# Patient Record
Sex: MALE | Employment: OTHER | URBAN - NONMETROPOLITAN AREA
[De-identification: names, ages, dates, MRNs, and addresses within clinical notes are randomized per-mention and may not be internally consistent; named-entity substitution may affect disease eponyms.]

---

## 2017-02-02 DIAGNOSIS — K21.00 REFLUX ESOPHAGITIS: ICD-10-CM

## 2017-02-02 DIAGNOSIS — E78.5 HYPERLIPIDEMIA, UNSPECIFIED HYPERLIPIDEMIA TYPE: ICD-10-CM

## 2017-02-02 DIAGNOSIS — I10 ESSENTIAL HYPERTENSION: ICD-10-CM

## 2017-02-03 RX ORDER — AMLODIPINE BESYLATE 10 MG/1
10 TABLET ORAL DAILY
Qty: 90 TAB | Refills: 0 | Status: SHIPPED | OUTPATIENT
Start: 2017-02-03 | End: 2017-06-15 | Stop reason: SDUPTHER

## 2017-02-03 RX ORDER — CARVEDILOL 25 MG/1
25 TABLET ORAL 2 TIMES DAILY WITH MEALS
Qty: 180 TAB | Refills: 0 | Status: SHIPPED | OUTPATIENT
Start: 2017-02-03 | End: 2017-06-15 | Stop reason: SDUPTHER

## 2017-02-03 RX ORDER — SIMVASTATIN 40 MG
40 TABLET ORAL EVERY EVENING
Qty: 90 TAB | Refills: 0 | Status: SHIPPED | OUTPATIENT
Start: 2017-02-03 | End: 2017-06-15 | Stop reason: SDUPTHER

## 2017-02-03 RX ORDER — LISINOPRIL AND HYDROCHLOROTHIAZIDE 25; 20 MG/1; MG/1
1 TABLET ORAL 2 TIMES DAILY
Qty: 180 TAB | Refills: 0 | Status: SHIPPED | OUTPATIENT
Start: 2017-02-03 | End: 2017-06-15 | Stop reason: SDUPTHER

## 2017-02-03 RX ORDER — LANSOPRAZOLE 30 MG/1
30 CAPSULE, DELAYED RELEASE ORAL DAILY
Qty: 90 CAP | Refills: 0 | Status: SHIPPED | OUTPATIENT
Start: 2017-02-03 | End: 2017-06-15 | Stop reason: SDUPTHER

## 2017-02-03 RX ORDER — POTASSIUM CHLORIDE 20 MEQ/1
20 TABLET, EXTENDED RELEASE ORAL 2 TIMES DAILY
Qty: 180 TAB | Refills: 0 | Status: SHIPPED | OUTPATIENT
Start: 2017-02-03 | End: 2017-06-15 | Stop reason: SDUPTHER

## 2017-06-15 ENCOUNTER — OFFICE VISIT (OUTPATIENT)
Dept: MEDICAL GROUP | Facility: PHYSICIAN GROUP | Age: 62
End: 2017-06-15
Payer: COMMERCIAL

## 2017-06-15 VITALS
OXYGEN SATURATION: 97 % | TEMPERATURE: 98.2 F | HEART RATE: 54 BPM | WEIGHT: 167 LBS | SYSTOLIC BLOOD PRESSURE: 122 MMHG | BODY MASS INDEX: 26.84 KG/M2 | HEIGHT: 66 IN | DIASTOLIC BLOOD PRESSURE: 90 MMHG

## 2017-06-15 DIAGNOSIS — E78.01 FAMILIAL HYPERCHOLESTEROLEMIA: ICD-10-CM

## 2017-06-15 DIAGNOSIS — Z13.21 ENCOUNTER FOR VITAMIN DEFICIENCY SCREENING: ICD-10-CM

## 2017-06-15 DIAGNOSIS — E78.5 HYPERLIPIDEMIA, UNSPECIFIED HYPERLIPIDEMIA TYPE: ICD-10-CM

## 2017-06-15 DIAGNOSIS — K21.00 REFLUX ESOPHAGITIS: ICD-10-CM

## 2017-06-15 DIAGNOSIS — Z12.5 PROSTATE CANCER SCREENING: ICD-10-CM

## 2017-06-15 DIAGNOSIS — M19.031 OSTEOARTHRITIS OF BOTH WRISTS, UNSPECIFIED OSTEOARTHRITIS TYPE: ICD-10-CM

## 2017-06-15 DIAGNOSIS — Z86.010 PERSONAL HISTORY OF COLONIC POLYPS: ICD-10-CM

## 2017-06-15 DIAGNOSIS — I10 ESSENTIAL HYPERTENSION: ICD-10-CM

## 2017-06-15 DIAGNOSIS — M19.032 OSTEOARTHRITIS OF BOTH WRISTS, UNSPECIFIED OSTEOARTHRITIS TYPE: ICD-10-CM

## 2017-06-15 DIAGNOSIS — K21.9 GASTROESOPHAGEAL REFLUX DISEASE WITHOUT ESOPHAGITIS: ICD-10-CM

## 2017-06-15 PROCEDURE — 99214 OFFICE O/P EST MOD 30 MIN: CPT | Performed by: INTERNAL MEDICINE

## 2017-06-15 RX ORDER — LANSOPRAZOLE 30 MG/1
30 CAPSULE, DELAYED RELEASE ORAL DAILY
Qty: 90 CAP | Refills: 1 | Status: SHIPPED | OUTPATIENT
Start: 2017-06-15 | End: 2017-12-05 | Stop reason: SDUPTHER

## 2017-06-15 RX ORDER — POTASSIUM CHLORIDE 20 MEQ/1
20 TABLET, EXTENDED RELEASE ORAL 2 TIMES DAILY
Qty: 180 TAB | Refills: 3 | Status: SHIPPED | OUTPATIENT
Start: 2017-06-15 | End: 2017-07-27 | Stop reason: SDUPTHER

## 2017-06-15 RX ORDER — DICLOFENAC SODIUM 75 MG/1
75 TABLET, DELAYED RELEASE ORAL 2 TIMES DAILY
Qty: 60 TAB | Refills: 5 | Status: SHIPPED | OUTPATIENT
Start: 2017-06-15 | End: 2017-12-15

## 2017-06-15 RX ORDER — SIMVASTATIN 40 MG
40 TABLET ORAL EVERY EVENING
Qty: 90 TAB | Refills: 3 | Status: SHIPPED | OUTPATIENT
Start: 2017-06-15 | End: 2018-05-12 | Stop reason: SDUPTHER

## 2017-06-15 RX ORDER — LISINOPRIL AND HYDROCHLOROTHIAZIDE 25; 20 MG/1; MG/1
1 TABLET ORAL 2 TIMES DAILY
Qty: 180 TAB | Refills: 3 | Status: SHIPPED | OUTPATIENT
Start: 2017-06-15 | End: 2018-05-12 | Stop reason: SDUPTHER

## 2017-06-15 RX ORDER — CARVEDILOL 25 MG/1
25 TABLET ORAL 2 TIMES DAILY WITH MEALS
Qty: 180 TAB | Refills: 3 | Status: SHIPPED | OUTPATIENT
Start: 2017-06-15 | End: 2018-05-12 | Stop reason: SDUPTHER

## 2017-06-15 RX ORDER — AMLODIPINE BESYLATE 10 MG/1
10 TABLET ORAL DAILY
Qty: 90 TAB | Refills: 3 | Status: SHIPPED | OUTPATIENT
Start: 2017-06-15 | End: 2018-05-12 | Stop reason: SDUPTHER

## 2017-06-15 ASSESSMENT — PATIENT HEALTH QUESTIONNAIRE - PHQ9: CLINICAL INTERPRETATION OF PHQ2 SCORE: 0

## 2017-06-15 ASSESSMENT — PAIN SCALES - GENERAL: PAINLEVEL: NO PAIN

## 2017-06-15 NOTE — MR AVS SNAPSHOT
"        Ulysses Olivas   6/15/2017 10:30 AM   Office Visit   MRN: [id number]    Department:  [facility]   Dept Phone:  [phone]    Description:  Male : 1955   Provider:  Tiarra DE LEON M.D.           Reason for Visit     Medication Refill all meds      Allergies as of 6/15/2017     No Known Allergies      You were diagnosed with     Personal history of colonic polyps   [V12.72.ICD-9-CM]       Essential hypertension   [8420780]       Familial hypercholesterolemia   [264956]       Reflux esophagitis   [530.11.ICD-9-CM]       Hyperlipidemia, unspecified hyperlipidemia type   [8701346]       Osteoarthritis of both wrists, unspecified osteoarthritis type   [5474258]       Encounter for vitamin deficiency screening   [620199]       Prostate cancer screening   [966953]       Gastroesophageal reflux disease without esophagitis   [388146]         Vital Signs     Blood Pressure Pulse Temperature Height Weight Body Mass Index    122/90 mmHg 54 36.8 °C (98.2 °F) 1.676 m (5' 5.98\") 75.751 kg (167 lb) 26.97 kg/m2    Oxygen Saturation Smoking Status                97% Never Smoker           Basic Information     Date Of Birth Sex Race Ethnicity Preferred Language    1955 Male Unknown Unknown English      Problem List              ICD-10-CM Priority Class Noted - Resolved    HTN (hypertension) I10 Medium Stage 1 1/3/2013 - Present    Hyperlipemia E78.5 Low Chronic 1/3/2013 - Present    GERD (gastroesophageal reflux disease) K21.9 Low Chronic 1/3/2013 - Present    Arrhythmia I49.9   2015 - Present    Prostate cancer screening Z12.5   2016 - Present    Personal history of colonic polyps Z86.010   6/15/2017 - Present      Health Maintenance        Date Due Completion Dates    COLONOSCOPY 2006 ---    IMM DTaP/Tdap/Td Vaccine (2 - Td) 2021            Current Immunizations     SHINGLES VACCINE 2017    Tdap Vaccine 2011      Below and/or attached are the medications your " provider expects you to take. Review all of your home medications and newly ordered medications with your provider and/or pharmacist. Follow medication instructions as directed by your provider and/or pharmacist. Please keep your medication list with you and share with your provider. Update the information when medications are discontinued, doses are changed, or new medications (including over-the-counter products) are added; and carry medication information at all times in the event of emergency situations     Allergies:  No Known Allergies          Medications  Valid as of: Tosin 15, 2017 - 11:22 AM    Generic Name Brand Name Tablet Size Instructions for use    AmLODIPine Besylate (Tab) NORVASC 10 MG Take 1 Tab by mouth every day.        Carvedilol (Tab) COREG 25 MG Take 1 Tab by mouth 2 times a day, with meals.        Diclofenac Sodium (Tablet Delayed Response) VOLTAREN 75 MG Take 1 Tab by mouth 2 times a day.        Lansoprazole (CAPSULE DELAYED RELEASE) PREVACID 30 MG Take 1 Cap by mouth every day.        Lisinopril-Hydrochlorothiazide (Tab) PRINZIDE, ZESTORETIC 20-25 MG Take 1 Tab by mouth 2 times a day.        Potassium Chloride Saima CR (Tab CR) Kdur 20 MEQ Take 1 Tab by mouth 2 times a day.        Simvastatin (Tab) ZOCOR 40 MG Take 1 Tab by mouth every evening.        .                 Medicines prescribed today were sent to:     [facility], [address]    [address] [place] [id number]    Phone: [phone] Fax: [phone]    Open 24 Hours?: No      Medication refill instructions:       If your prescription bottle indicates you have medication refills left, it is not necessary to call your provider’s office. Please contact your pharmacy and they will refill your medication.    If your prescription bottle indicates you do not have any refills left, you may request refills at any time through one of the following ways: The online [employer] system (except  Urgent Care), by calling your provider’s office, or by asking your pharmacy to contact your provider’s office with a refill request. Medication refills are processed only during regular business hours and may not be available until the next business day. Your provider may request additional information or to have a follow-up visit with you prior to refilling your medication.   *Please Note: Medication refills are assigned a new Rx number when refilled electronically. Your pharmacy may indicate that no refills were authorized even though a new prescription for the same medication is available at the pharmacy. Please request the medicine by name with the pharmacy before contacting your provider for a refill.        Your To Do List     Future Labs/Procedures Complete By Expires    CBC WITH DIFFERENTIAL  As directed 6/15/2018    COMP METABOLIC PANEL  As directed 6/15/2018    LIPID PROFILE  As directed 6/15/2018    PROSTATE SPECIFIC AG SCREENING  As directed 6/15/2018    VITAMIN D,25 HYDROXY  As directed 6/15/2018      Referral     A referral request has been sent to our patient care coordination department. Please allow 3-5 business days for us to process this request and contact you either by phone or mail. If you do not hear from us by the 5th business day, please call us at [phone].           [employer] Access Code: Activation code not generated  Current [employer] Status: Active

## 2017-06-15 NOTE — ASSESSMENT & PLAN NOTE
Patient reports he has had his colonoscopy at age 51, was positive for some polyps which were removed. He has not had subsequent colonoscopy. Data family history of colon cancer. Feels fine no abdomen pain night sweats weight change.

## 2017-06-15 NOTE — ASSESSMENT & PLAN NOTE
Not following bp at home.  He feels good, continues on the meds, amlodipine, carvedilol, lisinopril, hctz, potasium.

## 2017-06-15 NOTE — ASSESSMENT & PLAN NOTE
Patient states he is ok as long as he stays on the lansoprazole at 1/4 capsule a day.  Otherwise will have symptoms at 4 days.

## 2017-06-15 NOTE — PROGRESS NOTES
Chief Complaint   Patient presents with   • Medication Refill     all meds       HISTORY OF PRESENT ILLNESS: Patient is a 62 y.o. male established patient who presents today to discuss the medical issues below.    Personal history of colonic polyps  Patient reports he has had his colonoscopy at age 51, was positive for some polyps which were removed. He has not had subsequent colonoscopy. Denies family history of colon cancer. Feels fine, no abdomen pain night sweats weight change.    HTN (hypertension)  Not following bp at home.  He feels good, continues on the meds, amlodipine, carvedilol, lisinopril, hctz, potasium.    Hyperlipemia  Patient has not had labs done since 2015.      GERD (gastroesophageal reflux disease)  Patient states he is ok as long as he stays on the lansoprazole at 1/4 capsule a day.  Otherwise will have symptoms at 4 days.  Requesting refill.      Patient Active Problem List    Diagnosis Date Noted   • HTN (hypertension) 01/03/2013     Priority: Medium     Class: Stage 1   • Hyperlipemia 01/03/2013     Priority: Low     Class: Chronic   • GERD (gastroesophageal reflux disease) 01/03/2013     Priority: Low     Class: Chronic   • Personal history of colonic polyps 06/15/2017   • Prostate cancer screening 01/08/2016   • Arrhythmia 01/06/2015       Allergies:Review of patient's allergies indicates no known allergies.    Current Outpatient Prescriptions   Medication Sig Dispense Refill   • amlodipine (NORVASC) 10 MG Tab Take 1 Tab by mouth every day. 90 Tab 3   • carvedilol (COREG) 25 MG Tab Take 1 Tab by mouth 2 times a day, with meals. 180 Tab 3   • lansoprazole (PREVACID) 30 MG CAPSULE DELAYED RELEASE Take 1 Cap by mouth every day. 90 Cap 1   • lisinopril-hydrochlorothiazide (PRINZIDE, ZESTORETIC) 20-25 MG per tablet Take 1 Tab by mouth 2 times a day. 180 Tab 3   • potassium chloride SA (KDUR) 20 MEQ Tab CR Take 1 Tab by mouth 2 times a day. 180 Tab 3   • simvastatin (ZOCOR) 40 MG Tab Take 1  "Tab by mouth every evening. 90 Tab 3   • diclofenac EC (VOLTAREN) 75 MG Tablet Delayed Response Take 1 Tab by mouth 2 times a day. 60 Tab 5     No current facility-administered medications for this visit.         Past Medical History   Diagnosis Date   • Hypertension    • Hyperlipidemia    • GERD (gastroesophageal reflux disease)    • SVT (supraventricular tachycardia)    • Prostate cancer screening 2016   • Personal history of colonic polyps 6/15/2017       Social History   Substance Use Topics   • Smoking status: Never Smoker    • Smokeless tobacco: Never Used   • Alcohol Use: No       Family Status   Relation Status Death Age   • Mother Alive    • Father       Family History   Problem Relation Age of Onset   • Hypertension Mother    • Cancer Father 75     prostate       ROS:    Respiratory: Negative for cough, sputum production, shortness of breath or wheezing.    Cardiovascular: Negative for chest pain, palpitations, orthopnea, dyspnea with exertion or edema.   Gastrointestinal: Negative for GI upset, nausea, vomiting, abdominal pain, constipation or diarrhea.   Genitourinary: Negative for dysuria, urgency, hesitancy or frequency.       Exam:    Blood pressure 122/90, pulse 54, temperature 36.8 °C (98.2 °F), height 1.676 m (5' 5.98\"), weight 75.751 kg (167 lb), SpO2 97 %.  General:  Well nourished, well developed male in NAD.  HENT: Normocephalic, bilateral TMs are intact, nasal and oral mucosa with no lesions,   Neck: Supple without bruit. Thyroid is not enlarged.  Pulmonary: Clear to ausculation and percussion.  Normal effort. No rales, rhonchi, or wheezing.  Cardiovascular: Regular rate and rhythm without murmur.   Abdomen: Normal bowel sounds soft and nontender no palpable liver spleen bladder mass.  Extremities: No LE edema noted.  Neuro: Grossly nonfocal.  Psych: Alert and oriented to person, place, and time. Appropriate mood and conversation.        This dictation was created using voice " recognition software. I have made reasonable attempts to correct errors, however, errors of grammar and content may exist.          Assessment/Plan:    1. Personal history of colonic polyps  Discussed at length the significance of the history of colonic polyps recommendation for colonoscopy for surveillance. Fit test is negative for any blood discussed implications lack of specificity patient agrees if covered by insurance  - REFERRAL TO GASTROENTEROLOGY    2. Essential hypertension  Borderline not really following at home refills written discussed home monitoring laboratory monitoring  - COMP METABOLIC PANEL; Future  - CBC WITH DIFFERENTIAL; Future  - amlodipine (NORVASC) 10 MG Tab; Take 1 Tab by mouth every day.  Dispense: 90 Tab; Refill: 3  - carvedilol (COREG) 25 MG Tab; Take 1 Tab by mouth 2 times a day, with meals.  Dispense: 180 Tab; Refill: 3  - lisinopril-hydrochlorothiazide (PRINZIDE, ZESTORETIC) 20-25 MG per tablet; Take 1 Tab by mouth 2 times a day.  Dispense: 180 Tab; Refill: 3  - potassium chloride SA (KDUR) 20 MEQ Tab CR; Take 1 Tab by mouth 2 times a day.  Dispense: 180 Tab; Refill: 3    3. Familial hypercholesterolemia  Patient on statin recommend lab monitoring discussed  - LIPID PROFILE; Future    4. Reflux esophagitis  Implications of breakthrough and the use of his medications. Considering the osteoarthritis and trial of diclofenac patient will increase the Prevacid to one daily.  - lansoprazole (PREVACID) 30 MG CAPSULE DELAYED RELEASE; Take 1 Cap by mouth every day.  Dispense: 90 Cap; Refill: 1    5. Hyperlipidemia, unspecified hyperlipidemia type  Refill on meds lab monitoring discussed  - simvastatin (ZOCOR) 40 MG Tab; Take 1 Tab by mouth every evening.  Dispense: 90 Tab; Refill: 3    6. Osteoarthritis of both wrists, unspecified osteoarthritis type  Some synovial thickening and perfusion of the wrists bilaterally. Recommend trial diclofenac prescription written discussed GI potential side  effects and cautions given.    7. Encounter for vitamin deficiency screening  Screening discussed  - VITAMIN D,25 HYDROXY; Future    8. Prostate cancer screening  Screening discussed  - PROSTATE SPECIFIC AG SCREENING; Future    Patient was seen for  25 minutes face to face of which more than 50% of the time was spent in counseling and coordination of care regarding the above problems.

## 2017-07-17 ENCOUNTER — TELEPHONE (OUTPATIENT)
Dept: MEDICAL GROUP | Facility: PHYSICIAN GROUP | Age: 62
End: 2017-07-17

## 2017-07-17 ENCOUNTER — HOSPITAL ENCOUNTER (OUTPATIENT)
Dept: LAB | Facility: MEDICAL CENTER | Age: 62
End: 2017-07-17
Attending: INTERNAL MEDICINE
Payer: COMMERCIAL

## 2017-07-17 DIAGNOSIS — I10 ESSENTIAL HYPERTENSION: ICD-10-CM

## 2017-07-17 DIAGNOSIS — E87.6 HYPOKALEMIA: ICD-10-CM

## 2017-07-17 DIAGNOSIS — E78.01 FAMILIAL HYPERCHOLESTEROLEMIA: ICD-10-CM

## 2017-07-17 DIAGNOSIS — Z12.5 PROSTATE CANCER SCREENING: ICD-10-CM

## 2017-07-17 DIAGNOSIS — Z13.21 ENCOUNTER FOR VITAMIN DEFICIENCY SCREENING: ICD-10-CM

## 2017-07-17 LAB
25(OH)D3 SERPL-MCNC: 38 NG/ML (ref 30–100)
ALBUMIN SERPL BCP-MCNC: 4 G/DL (ref 3.2–4.9)
ALBUMIN/GLOB SERPL: 1.5 G/DL
ALP SERPL-CCNC: 47 U/L (ref 30–99)
ALT SERPL-CCNC: 19 U/L (ref 2–50)
ANION GAP SERPL CALC-SCNC: 7 MMOL/L (ref 0–11.9)
AST SERPL-CCNC: 21 U/L (ref 12–45)
BASOPHILS # BLD AUTO: 0.6 % (ref 0–1.8)
BASOPHILS # BLD: 0.03 K/UL (ref 0–0.12)
BILIRUB SERPL-MCNC: 0.9 MG/DL (ref 0.1–1.5)
BUN SERPL-MCNC: 14 MG/DL (ref 8–22)
CALCIUM SERPL-MCNC: 9.4 MG/DL (ref 8.5–10.5)
CHLORIDE SERPL-SCNC: 104 MMOL/L (ref 96–112)
CHOLEST SERPL-MCNC: 130 MG/DL (ref 100–199)
CO2 SERPL-SCNC: 28 MMOL/L (ref 20–33)
CREAT SERPL-MCNC: 0.67 MG/DL (ref 0.5–1.4)
EOSINOPHIL # BLD AUTO: 0.09 K/UL (ref 0–0.51)
EOSINOPHIL NFR BLD: 1.7 % (ref 0–6.9)
ERYTHROCYTE [DISTWIDTH] IN BLOOD BY AUTOMATED COUNT: 38.3 FL (ref 35.9–50)
GFR SERPL CREATININE-BSD FRML MDRD: >60 ML/MIN/1.73 M 2
GLOBULIN SER CALC-MCNC: 2.6 G/DL (ref 1.9–3.5)
GLUCOSE SERPL-MCNC: 99 MG/DL (ref 65–99)
HCT VFR BLD AUTO: 38.4 % (ref 42–52)
HDLC SERPL-MCNC: 44 MG/DL
HGB BLD-MCNC: 13.4 G/DL (ref 14–18)
IMM GRANULOCYTES # BLD AUTO: 0 K/UL (ref 0–0.11)
IMM GRANULOCYTES NFR BLD AUTO: 0 % (ref 0–0.9)
LDLC SERPL CALC-MCNC: 71 MG/DL
LYMPHOCYTES # BLD AUTO: 1.89 K/UL (ref 1–4.8)
LYMPHOCYTES NFR BLD: 35.8 % (ref 22–41)
MCH RBC QN AUTO: 29.8 PG (ref 27–33)
MCHC RBC AUTO-ENTMCNC: 34.9 G/DL (ref 33.7–35.3)
MCV RBC AUTO: 85.5 FL (ref 81.4–97.8)
MONOCYTES # BLD AUTO: 0.53 K/UL (ref 0–0.85)
MONOCYTES NFR BLD AUTO: 10 % (ref 0–13.4)
NEUTROPHILS # BLD AUTO: 2.74 K/UL (ref 1.82–7.42)
NEUTROPHILS NFR BLD: 51.9 % (ref 44–72)
NRBC # BLD AUTO: 0 K/UL
NRBC BLD AUTO-RTO: 0 /100 WBC
PLATELET # BLD AUTO: 258 K/UL (ref 164–446)
PMV BLD AUTO: 10.3 FL (ref 9–12.9)
POTASSIUM SERPL-SCNC: 2.7 MMOL/L (ref 3.6–5.5)
PROT SERPL-MCNC: 6.6 G/DL (ref 6–8.2)
PSA SERPL-MCNC: 1.78 NG/ML (ref 0–4)
RBC # BLD AUTO: 4.49 M/UL (ref 4.7–6.1)
SODIUM SERPL-SCNC: 139 MMOL/L (ref 135–145)
TRIGL SERPL-MCNC: 75 MG/DL (ref 0–149)
WBC # BLD AUTO: 5.3 K/UL (ref 4.8–10.8)

## 2017-07-17 PROCEDURE — 80053 COMPREHEN METABOLIC PANEL: CPT

## 2017-07-17 PROCEDURE — 36415 COLL VENOUS BLD VENIPUNCTURE: CPT

## 2017-07-17 PROCEDURE — 85025 COMPLETE CBC W/AUTO DIFF WBC: CPT

## 2017-07-17 PROCEDURE — 84153 ASSAY OF PSA TOTAL: CPT

## 2017-07-17 PROCEDURE — 80061 LIPID PANEL: CPT

## 2017-07-17 PROCEDURE — 82306 VITAMIN D 25 HYDROXY: CPT

## 2017-07-18 NOTE — TELEPHONE ENCOUNTER
After hours call from lab, critical lab level potassium 2.7.  All other labs wnl including Cr and GFR  Telephone to cell, no answer, message left to contact the office in the am:    We need to confirm that patient is taking his potassium 2 x a day  If so, he needs to double that and then have recheck on his potassium in 1 week.    If he has not been taking the potassium, he needs to start and have labs rechecked in 1 week    Order placed for lab.

## 2017-07-26 ENCOUNTER — HOSPITAL ENCOUNTER (OUTPATIENT)
Dept: LAB | Facility: MEDICAL CENTER | Age: 62
End: 2017-07-26
Attending: INTERNAL MEDICINE
Payer: COMMERCIAL

## 2017-07-26 DIAGNOSIS — I10 ESSENTIAL HYPERTENSION: ICD-10-CM

## 2017-07-26 DIAGNOSIS — E87.6 HYPOKALEMIA: ICD-10-CM

## 2017-07-26 LAB
ANION GAP SERPL CALC-SCNC: 6 MMOL/L (ref 0–11.9)
BUN SERPL-MCNC: 18 MG/DL (ref 8–22)
CALCIUM SERPL-MCNC: 9.1 MG/DL (ref 8.5–10.5)
CHLORIDE SERPL-SCNC: 107 MMOL/L (ref 96–112)
CO2 SERPL-SCNC: 27 MMOL/L (ref 20–33)
CREAT SERPL-MCNC: 0.75 MG/DL (ref 0.5–1.4)
GFR SERPL CREATININE-BSD FRML MDRD: >60 ML/MIN/1.73 M 2
GLUCOSE SERPL-MCNC: 96 MG/DL (ref 65–99)
POTASSIUM SERPL-SCNC: 3.4 MMOL/L (ref 3.6–5.5)
SODIUM SERPL-SCNC: 140 MMOL/L (ref 135–145)

## 2017-07-26 PROCEDURE — 36415 COLL VENOUS BLD VENIPUNCTURE: CPT

## 2017-07-26 PROCEDURE — 80048 BASIC METABOLIC PNL TOTAL CA: CPT

## 2017-07-27 ENCOUNTER — TELEPHONE (OUTPATIENT)
Dept: MEDICAL GROUP | Facility: PHYSICIAN GROUP | Age: 62
End: 2017-07-27

## 2017-07-27 DIAGNOSIS — I10 ESSENTIAL HYPERTENSION: ICD-10-CM

## 2017-07-27 DIAGNOSIS — E87.6 HYPOKALEMIA: ICD-10-CM

## 2017-07-27 RX ORDER — POTASSIUM CHLORIDE 20 MEQ/1
40 TABLET, EXTENDED RELEASE ORAL 2 TIMES DAILY
Qty: 360 TAB | Refills: 3 | Status: SHIPPED | OUTPATIENT
Start: 2017-07-27 | End: 2018-05-12 | Stop reason: SDUPTHER

## 2017-07-27 NOTE — TELEPHONE ENCOUNTER
Pt states that he will run out of potassium if he is taking that many.  Can this be refilled?  Please advise

## 2017-07-27 NOTE — TELEPHONE ENCOUNTER
Please notify patient that with the double dose his potassium is now nearly normal.  I would recommend a few extra tabs daily (2 tabs 3 x a day) for just 3 days, then back to the 2 tabs bid, recheck labs in about 6 weeks to make sure holding ok.  I have ordered labs.

## 2017-12-04 ENCOUNTER — NON-PROVIDER VISIT (OUTPATIENT)
Dept: URGENT CARE | Facility: PHYSICIAN GROUP | Age: 62
End: 2017-12-04

## 2017-12-04 DIAGNOSIS — Z11.1 ENCOUNTER FOR PPD TEST: ICD-10-CM

## 2017-12-04 PROCEDURE — 86580 TB INTRADERMAL TEST: CPT | Performed by: PHYSICIAN ASSISTANT

## 2017-12-06 ENCOUNTER — NON-PROVIDER VISIT (OUTPATIENT)
Dept: URGENT CARE | Facility: PHYSICIAN GROUP | Age: 62
End: 2017-12-06
Payer: COMMERCIAL

## 2017-12-06 DIAGNOSIS — Z11.1 ENCOUNTER FOR PPD SKIN TEST READING: ICD-10-CM

## 2017-12-06 LAB — TB WHEAL 3D P 5 TU DIAM: NORMAL MM

## 2017-12-15 ENCOUNTER — OFFICE VISIT (OUTPATIENT)
Dept: MEDICAL GROUP | Facility: PHYSICIAN GROUP | Age: 62
End: 2017-12-15
Payer: COMMERCIAL

## 2017-12-15 VITALS
HEIGHT: 66 IN | HEART RATE: 58 BPM | RESPIRATION RATE: 16 BRPM | BODY MASS INDEX: 28.61 KG/M2 | WEIGHT: 178 LBS | SYSTOLIC BLOOD PRESSURE: 120 MMHG | DIASTOLIC BLOOD PRESSURE: 88 MMHG | OXYGEN SATURATION: 96 % | TEMPERATURE: 98.1 F

## 2017-12-15 DIAGNOSIS — Z12.5 PROSTATE CANCER SCREENING: ICD-10-CM

## 2017-12-15 DIAGNOSIS — I10 ESSENTIAL HYPERTENSION: ICD-10-CM

## 2017-12-15 DIAGNOSIS — E55.9 VITAMIN D DEFICIENCY: ICD-10-CM

## 2017-12-15 DIAGNOSIS — E78.01 FAMILIAL HYPERCHOLESTEROLEMIA: ICD-10-CM

## 2017-12-15 DIAGNOSIS — M71.339: ICD-10-CM

## 2017-12-15 PROCEDURE — 99214 OFFICE O/P EST MOD 30 MIN: CPT | Performed by: INTERNAL MEDICINE

## 2017-12-15 RX ORDER — CELECOXIB 100 MG/1
100 CAPSULE ORAL 2 TIMES DAILY
Qty: 60 CAP | Refills: 3 | Status: SHIPPED | OUTPATIENT
Start: 2017-12-15 | End: 2018-08-02

## 2017-12-15 ASSESSMENT — PAIN SCALES - GENERAL: PAINLEVEL: 9=SEVERE PAIN

## 2017-12-15 NOTE — ASSESSMENT & PLAN NOTE
Patient reports that he is continually having burning sensation across the back of his hand off and on throughout the day but mostly at night. The echogenic was not helpful at all. He does do a lot of physical labor during the day such as shoveling or hammering. He believes he follows a position of function. He does have deformity of both wrists at the medial aspect and cannot identify how long the deformity has been present. He states his father's hands look exactly the same. He cannot recall any antedating trauma. Right hand is worse than the left. No weakness no pain.

## 2017-12-15 NOTE — PATIENT INSTRUCTIONS
Celebrex 100 mg 1 tab 2 x a day with food  If not improved in 2 weeks, consider steroid trial  Consider orthopedic consult.

## 2017-12-15 NOTE — PROGRESS NOTES
Chief Complaint   Patient presents with   • Wrist Pain     FV medication/ diclofenac       HISTORY OF PRESENT ILLNESS: Patient is a 62 y.o. male established patient who presents today to discuss the medical issues below.    HTN (hypertension)  Patient states he feels fine.  No chest pain or sob.      Other bursal cyst of wrist  Patient reports that he is continually having burning sensation across the back of his hand off and on throughout the day but mostly at night. The echogenic was not helpful at all. He does do a lot of physical labor during the day such as shoveling or hammering. He believes he follows a position of function. He does have deformity of both wrists at the medial aspect and cannot identify how long the deformity has been present. He states his father's hands look exactly the same. He cannot recall any antedating trauma. Right hand is worse than the left. No weakness no pain.      Patient Active Problem List    Diagnosis Date Noted   • HTN (hypertension) 01/03/2013     Priority: Medium     Class: Stage 1   • Hyperlipemia 01/03/2013     Priority: Low     Class: Chronic   • GERD (gastroesophageal reflux disease) 01/03/2013     Priority: Low     Class: Chronic   • Other bursal cyst of wrist 12/15/2017   • Personal history of colonic polyps 06/15/2017   • Prostate cancer screening 01/08/2016   • Arrhythmia 01/06/2015       Allergies:Patient has no known allergies.    Current Outpatient Prescriptions   Medication Sig Dispense Refill   • celecoxib (CELEBREX) 100 MG Cap Take 1 Cap by mouth 2 times a day. 60 Cap 3   • lansoprazole (PREVACID) 30 MG CAPSULE DELAYED RELEASE TAKE ONE CAPSULE BY MOUTH EVERY DAY 90 Cap 3   • potassium chloride SA (KDUR) 20 MEQ Tab CR Take 2 Tabs by mouth 2 times a day. 360 Tab 3   • amlodipine (NORVASC) 10 MG Tab Take 1 Tab by mouth every day. 90 Tab 3   • carvedilol (COREG) 25 MG Tab Take 1 Tab by mouth 2 times a day, with meals. 180 Tab 3   • lisinopril-hydrochlorothiazide  "(PRINZIDE, ZESTORETIC) 20-25 MG per tablet Take 1 Tab by mouth 2 times a day. 180 Tab 3   • simvastatin (ZOCOR) 40 MG Tab Take 1 Tab by mouth every evening. 90 Tab 3     No current facility-administered medications for this visit.          Past Medical History:   Diagnosis Date   • GERD (gastroesophageal reflux disease)    • Hyperlipidemia    • Hypertension    • Personal history of colonic polyps 6/15/2017   • Prostate cancer screening 2016   • SVT (supraventricular tachycardia) ([facility])        Social History   Substance Use Topics   • Smoking status: Never Smoker   • Smokeless tobacco: Never Used   • Alcohol use No       Family Status   Relation Status   • Mother Alive   • Father      Family History   Problem Relation Age of Onset   • Hypertension Mother    • Cancer Father 75     prostate       ROS:    Respiratory: Negative for cough, sputum production, shortness of breath or wheezing.    Cardiovascular: Negative for chest pain, palpitations, orthopnea, dyspnea with exertion or edema.   Gastrointestinal: Negative for GI upset, nausea, vomiting, abdominal pain, constipation or diarrhea.   Genitourinary: Negative for dysuria, urgency, hesitancy or frequency.       Exam:    Blood pressure 120/88, pulse (!) 58, temperature 36.7 °C (98.1 °F), resp. rate 16, height 1.676 m (5' 6\"), weight 80.7 kg (178 lb), SpO2 96 %.  General:  Well nourished, well developed male in NAD.  HENT: Normocephalic, bilateral TMs are intact, nasal and oral mucosa with no lesions,   Neck: Supple without bruit. Thyroid is not enlarged.  Pulmonary: Clear to ausculation and percussion.  Normal effort. No rales, rhonchi, or wheezing.  Cardiovascular: Regular rate and rhythm without murmur.   Abdomen: Normal bowel sounds soft and nontender no palpable liver spleen bladder mass.  Upper extremity: Wrists bilaterally at the base of the thumb medial aspect of the wrist palpable fullness ballotable nontender. No wrist tenderness cannot " appreciate an effusion on either wrist intact light touch strength and sensation throughout the entire hand and wrist and forearm. No exacerbation with pronation supination no tenderness at the elbow.   Neuro: Grossly nonfocal.  Psych: Alert and oriented to person, place, and time. Appropriate mood and conversation.        This dictation was created using voice recognition software. I have made reasonable attempts to correct errors, however, errors of grammar and content may exist.          Assessment/Plan:    1. Essential hypertension  Stable with ongoing medications he hasn't had his potassium reassessed I recommend lab work  - COMP METABOLIC PANEL; Future  - CBC WITH DIFFERENTIAL; Future    2. Other bursal cyst of wrist, unspecified laterality  It appears that the bursal cysts are impinging on the nerves causing a minor neuropathy. I've recommended referral to orthopedic he declines. He would like a trial of another anti-inflammatory. Trial change to Celebrex. If still persisting consider a Medrol Dosepak. Discussed with the patient option for [facility] to be able to aspirate and/or inject. Discussed potential differential of an autoimmune disease however he declines laboratory assessment at this time as well. Monitor support.    3. Familial hypercholesterolemia  Continues on simvastatin discussed annual monitoring  - LIPID PROFILE; Future    4. Prostate cancer screening  Discussed annual monitoring PSA  - PROSTATE SPECIFIC AG SCREENING; Future    5. Vitamin D deficiency  Discussed vitamin D monitoring  - VITAMIN D,25 HYDROXY; Future    Patient was seen for  25 minutes face to face of which more than 50% of the time was spent in counseling and coordination of care regarding the above problems.

## 2018-02-15 DIAGNOSIS — E78.5 HYPERLIPIDEMIA, UNSPECIFIED HYPERLIPIDEMIA TYPE: ICD-10-CM

## 2018-02-15 DIAGNOSIS — I10 ESSENTIAL HYPERTENSION: ICD-10-CM

## 2018-02-15 RX ORDER — CARVEDILOL 25 MG/1
TABLET ORAL
Refills: 3 | OUTPATIENT
Start: 2018-02-15

## 2018-02-15 RX ORDER — LISINOPRIL AND HYDROCHLOROTHIAZIDE 25; 20 MG/1; MG/1
TABLET ORAL
Refills: 3 | OUTPATIENT
Start: 2018-02-15

## 2018-02-15 RX ORDER — POTASSIUM CHLORIDE 20 MEQ/1
TABLET, EXTENDED RELEASE ORAL
Refills: 3 | OUTPATIENT
Start: 2018-02-15

## 2018-02-15 RX ORDER — AMLODIPINE BESYLATE 10 MG/1
TABLET ORAL
Refills: 3 | OUTPATIENT
Start: 2018-02-15

## 2018-02-15 RX ORDER — SIMVASTATIN 40 MG
TABLET ORAL
Refills: 3 | OUTPATIENT
Start: 2018-02-15

## 2018-02-15 RX ORDER — CELECOXIB 100 MG/1
CAPSULE ORAL
Refills: 3 | OUTPATIENT
Start: 2018-02-15

## 2018-03-05 ENCOUNTER — PATIENT MESSAGE (OUTPATIENT)
Dept: MEDICAL GROUP | Facility: PHYSICIAN GROUP | Age: 63
End: 2018-03-05

## 2018-03-05 NOTE — TELEPHONE ENCOUNTER
From: Ulysses Olivas  To: Tiarra DE LEON M.D.  Sent: 3/5/2018 10:32 AM PST  Subject: Procedure Question    My wife has been trying to make an appointment with you. The system will not allow it because she has not been seen by you before. One of the first questions I ask you as a new patient was if you would see her also and you told me yes. What do we have to do to make this happen. We have left a message with your office but no responce. Please respond. Ulysses

## 2018-05-12 DIAGNOSIS — I10 ESSENTIAL HYPERTENSION: ICD-10-CM

## 2018-05-12 DIAGNOSIS — E78.5 HYPERLIPIDEMIA, UNSPECIFIED HYPERLIPIDEMIA TYPE: ICD-10-CM

## 2018-05-16 ENCOUNTER — TELEPHONE (OUTPATIENT)
Dept: MEDICAL GROUP | Facility: PHYSICIAN GROUP | Age: 63
End: 2018-05-16

## 2018-05-16 DIAGNOSIS — I10 ESSENTIAL HYPERTENSION: ICD-10-CM

## 2018-05-16 RX ORDER — AMLODIPINE BESYLATE 10 MG/1
TABLET ORAL
Qty: 90 TAB | Refills: 3 | Status: SHIPPED | OUTPATIENT
Start: 2018-05-16 | End: 2019-07-09 | Stop reason: SDUPTHER

## 2018-05-16 RX ORDER — SIMVASTATIN 40 MG
TABLET ORAL
Qty: 90 TAB | Refills: 3 | Status: SHIPPED | OUTPATIENT
Start: 2018-05-16 | End: 2019-07-09 | Stop reason: SDUPTHER

## 2018-05-16 RX ORDER — POTASSIUM CHLORIDE 20 MEQ/1
TABLET, EXTENDED RELEASE ORAL
Qty: 180 TAB | Refills: 3 | Status: SHIPPED | OUTPATIENT
Start: 2018-05-16 | End: 2018-07-18 | Stop reason: SDUPTHER

## 2018-05-16 RX ORDER — CARVEDILOL 25 MG/1
25 TABLET ORAL 2 TIMES DAILY WITH MEALS
Qty: 180 TAB | Refills: 3 | Status: SHIPPED
Start: 2018-05-16 | End: 2020-02-10

## 2018-05-16 RX ORDER — LISINOPRIL AND HYDROCHLOROTHIAZIDE 25; 20 MG/1; MG/1
TABLET ORAL
Qty: 90 TAB | Refills: 3 | Status: SHIPPED | OUTPATIENT
Start: 2018-05-16 | End: 2018-05-16 | Stop reason: SDUPTHER

## 2018-05-16 RX ORDER — CARVEDILOL 25 MG/1
TABLET ORAL
Qty: 90 TAB | Refills: 3 | Status: SHIPPED | OUTPATIENT
Start: 2018-05-16 | End: 2018-05-16 | Stop reason: SDUPTHER

## 2018-05-16 RX ORDER — LISINOPRIL AND HYDROCHLOROTHIAZIDE 25; 20 MG/1; MG/1
1 TABLET ORAL 2 TIMES DAILY
Qty: 180 TAB | Refills: 3 | Status: SHIPPED | OUTPATIENT
Start: 2018-05-16 | End: 2018-08-02

## 2018-05-16 NOTE — TELEPHONE ENCOUNTER
1. Caller Name:[facility]                      Call Back Number: [phone]    2. Message: rosemary called regarding carvedilol, lisinopril and pottasium Chloride refills that they received today.  They said that The carvedilol and lisinopril are both 2 tabs daily but the scripts are written for 90 tabs instead of 180. And the potassium Chloride is written for 2 tabs 2 times a day 180 tabs, can this be changed to 360 tabs?  Please advise     3. Patient approves office to leave a detailed voicemail/MyChart message: N\A

## 2018-07-11 ENCOUNTER — HOSPITAL ENCOUNTER (OUTPATIENT)
Dept: LAB | Facility: MEDICAL CENTER | Age: 63
End: 2018-07-11
Attending: INTERNAL MEDICINE
Payer: COMMERCIAL

## 2018-07-11 DIAGNOSIS — Z12.5 PROSTATE CANCER SCREENING: ICD-10-CM

## 2018-07-11 DIAGNOSIS — E78.01 FAMILIAL HYPERCHOLESTEROLEMIA: ICD-10-CM

## 2018-07-11 DIAGNOSIS — I10 ESSENTIAL HYPERTENSION: ICD-10-CM

## 2018-07-11 DIAGNOSIS — E55.9 VITAMIN D DEFICIENCY: ICD-10-CM

## 2018-07-11 LAB
25(OH)D3 SERPL-MCNC: 51 NG/ML (ref 30–100)
ALBUMIN SERPL BCP-MCNC: 4.4 G/DL (ref 3.2–4.9)
ALBUMIN/GLOB SERPL: 1.8 G/DL
ALP SERPL-CCNC: 47 U/L (ref 30–99)
ALT SERPL-CCNC: 43 U/L (ref 2–50)
ANION GAP SERPL CALC-SCNC: 12 MMOL/L (ref 0–11.9)
AST SERPL-CCNC: 29 U/L (ref 12–45)
BASOPHILS # BLD AUTO: 0.9 % (ref 0–1.8)
BASOPHILS # BLD: 0.05 K/UL (ref 0–0.12)
BILIRUB SERPL-MCNC: 1.1 MG/DL (ref 0.1–1.5)
BUN SERPL-MCNC: 14 MG/DL (ref 8–22)
CALCIUM SERPL-MCNC: 9.3 MG/DL (ref 8.5–10.5)
CHLORIDE SERPL-SCNC: 101 MMOL/L (ref 96–112)
CHOLEST SERPL-MCNC: 155 MG/DL (ref 100–199)
CO2 SERPL-SCNC: 27 MMOL/L (ref 20–33)
CREAT SERPL-MCNC: 0.73 MG/DL (ref 0.5–1.4)
EOSINOPHIL # BLD AUTO: 0.09 K/UL (ref 0–0.51)
EOSINOPHIL NFR BLD: 1.6 % (ref 0–6.9)
ERYTHROCYTE [DISTWIDTH] IN BLOOD BY AUTOMATED COUNT: 39.7 FL (ref 35.9–50)
GLOBULIN SER CALC-MCNC: 2.5 G/DL (ref 1.9–3.5)
GLUCOSE SERPL-MCNC: 98 MG/DL (ref 65–99)
HCT VFR BLD AUTO: 41.9 % (ref 42–52)
HDLC SERPL-MCNC: 38 MG/DL
HGB BLD-MCNC: 14.5 G/DL (ref 14–18)
IMM GRANULOCYTES # BLD AUTO: 0.01 K/UL (ref 0–0.11)
IMM GRANULOCYTES NFR BLD AUTO: 0.2 % (ref 0–0.9)
LDLC SERPL CALC-MCNC: 95 MG/DL
LYMPHOCYTES # BLD AUTO: 2.09 K/UL (ref 1–4.8)
LYMPHOCYTES NFR BLD: 36.5 % (ref 22–41)
MCH RBC QN AUTO: 29.8 PG (ref 27–33)
MCHC RBC AUTO-ENTMCNC: 34.6 G/DL (ref 33.7–35.3)
MCV RBC AUTO: 86 FL (ref 81.4–97.8)
MONOCYTES # BLD AUTO: 0.55 K/UL (ref 0–0.85)
MONOCYTES NFR BLD AUTO: 9.6 % (ref 0–13.4)
NEUTROPHILS # BLD AUTO: 2.94 K/UL (ref 1.82–7.42)
NEUTROPHILS NFR BLD: 51.2 % (ref 44–72)
NRBC # BLD AUTO: 0 K/UL
NRBC BLD-RTO: 0 /100 WBC
PLATELET # BLD AUTO: 255 K/UL (ref 164–446)
PMV BLD AUTO: 10.5 FL (ref 9–12.9)
POTASSIUM SERPL-SCNC: 2.6 MMOL/L (ref 3.6–5.5)
PROT SERPL-MCNC: 6.9 G/DL (ref 6–8.2)
PSA SERPL-MCNC: 2.95 NG/ML (ref 0–4)
RBC # BLD AUTO: 4.87 M/UL (ref 4.7–6.1)
SODIUM SERPL-SCNC: 140 MMOL/L (ref 135–145)
TRIGL SERPL-MCNC: 109 MG/DL (ref 0–149)
WBC # BLD AUTO: 5.7 K/UL (ref 4.8–10.8)

## 2018-07-11 PROCEDURE — 84153 ASSAY OF PSA TOTAL: CPT

## 2018-07-11 PROCEDURE — 36415 COLL VENOUS BLD VENIPUNCTURE: CPT

## 2018-07-11 PROCEDURE — 85025 COMPLETE CBC W/AUTO DIFF WBC: CPT

## 2018-07-11 PROCEDURE — 82306 VITAMIN D 25 HYDROXY: CPT

## 2018-07-11 PROCEDURE — 80053 COMPREHEN METABOLIC PANEL: CPT

## 2018-07-11 PROCEDURE — 80061 LIPID PANEL: CPT

## 2018-07-12 ENCOUNTER — TELEPHONE (OUTPATIENT)
Dept: MEDICAL GROUP | Facility: PHYSICIAN GROUP | Age: 63
End: 2018-07-12

## 2018-07-12 NOTE — TELEPHONE ENCOUNTER
----- Message from Tiarra DE LEON M.D. sent at 7/12/2018  7:51 AM PDT -----  Please contact the patient and make sure he is taking his potassium.  If so, I recommend he take an extra 2 tabs a day until the OV as his level is low.    Thank you,  Tiarra DE LEON M.D.

## 2018-07-12 NOTE — TELEPHONE ENCOUNTER
Ulysses stated that he is taking 4 tablets of his potassium every day.  I let him know that you want him taking 2 more tabs a day.  He has an apt with you on 7/18/18

## 2018-07-12 NOTE — PROGRESS NOTES
Please contact the patient and make sure he is taking his potassium.  If so, I recommend he take an extra 2 tabs a day until the OV as his level is low.    Thank you,  Tiarra DE LEON M.D.

## 2018-07-18 ENCOUNTER — TELEPHONE (OUTPATIENT)
Dept: MEDICAL GROUP | Facility: PHYSICIAN GROUP | Age: 63
End: 2018-07-18

## 2018-07-18 DIAGNOSIS — I10 ESSENTIAL HYPERTENSION: ICD-10-CM

## 2018-07-18 DIAGNOSIS — E87.6 HYPOKALEMIA: ICD-10-CM

## 2018-07-18 RX ORDER — POTASSIUM CHLORIDE 20 MEQ/1
40 TABLET, EXTENDED RELEASE ORAL 2 TIMES DAILY
Qty: 360 TAB | Refills: 1 | Status: SHIPPED | OUTPATIENT
Start: 2018-07-18 | End: 2018-07-19 | Stop reason: SDUPTHER

## 2018-07-18 RX ORDER — POTASSIUM CHLORIDE 20 MEQ/1
40 TABLET, EXTENDED RELEASE ORAL 2 TIMES DAILY
Qty: 180 TAB | Refills: 3 | Status: CANCELLED | OUTPATIENT
Start: 2018-07-18

## 2018-07-18 NOTE — TELEPHONE ENCOUNTER
1. Caller Name: Pt                      Call Back Number: [phone] (home)     2. Message: Pt called in stating that he was to have an OV this week to go over labs and Pot[place]um intake. Dr. Monterroso was to let him know if is to continue on potassium or stop it but now doesn't see her till 8/2/18 and will need a refill. Pt would like to know what he should do. Please advise.    3. Patient approves office to leave a detailed voicemail/MyChart message: yes

## 2018-07-18 NOTE — TELEPHONE ENCOUNTER
Keep taking the 2 tablets a day. Recommend we check the potassium level in the next 2 days. Order placed. Will have more recommendations once I have the lab results.     All other lab results were great

## 2018-07-19 ENCOUNTER — PATIENT MESSAGE (OUTPATIENT)
Dept: MEDICAL GROUP | Facility: PHYSICIAN GROUP | Age: 63
End: 2018-07-19

## 2018-07-19 ENCOUNTER — TELEPHONE (OUTPATIENT)
Dept: MEDICAL GROUP | Facility: PHYSICIAN GROUP | Age: 63
End: 2018-07-19

## 2018-07-19 DIAGNOSIS — I10 ESSENTIAL HYPERTENSION: ICD-10-CM

## 2018-07-19 DIAGNOSIS — E87.6 HYPOKALEMIA: ICD-10-CM

## 2018-07-19 RX ORDER — POTASSIUM CHLORIDE 20 MEQ/1
40 TABLET, EXTENDED RELEASE ORAL 3 TIMES DAILY
Qty: 540 TAB | Refills: 3 | Status: SHIPPED | OUTPATIENT
Start: 2018-07-19 | End: 2019-07-29 | Stop reason: SDUPTHER

## 2018-07-19 NOTE — TELEPHONE ENCOUNTER
Patient called and stated that his insurance will not cover his potassium unless it stated to take 6 tabs daily.   wrote a telephone encounter on 7/12/18 that she wanted him to take 2 extra tabs a day until her OV with him.  Can his script be changed to say 6 tabs a day?  Please advise

## 2018-07-19 NOTE — TELEPHONE ENCOUNTER
Will adjust Rx but he may not be taking  6 tablets on a daily basis, we need to go off of the labs.

## 2018-07-19 NOTE — TELEPHONE ENCOUNTER
----- Message from Lana Brown sent at 7/19/2018 12:51 PM PDT -----  Regarding: Potassium RX    Pt states that insurance will not cover refill for:     potassium chloride SA (KDUR) 20 MEQ Tab CR     Pt states that he has been taking 4 tabs daily and that Dr Monterroso advised him to increase to 6. Pt states that insurance will not cover unless prescription says to take 6 tab daily.   Please advise.      Thank you

## 2018-08-02 ENCOUNTER — OFFICE VISIT (OUTPATIENT)
Dept: MEDICAL GROUP | Facility: PHYSICIAN GROUP | Age: 63
End: 2018-08-02
Payer: COMMERCIAL

## 2018-08-02 VITALS
HEART RATE: 61 BPM | BODY MASS INDEX: 28.51 KG/M2 | OXYGEN SATURATION: 98 % | SYSTOLIC BLOOD PRESSURE: 116 MMHG | DIASTOLIC BLOOD PRESSURE: 60 MMHG | TEMPERATURE: 98.3 F | HEIGHT: 66 IN | RESPIRATION RATE: 14 BRPM | WEIGHT: 177.4 LBS

## 2018-08-02 DIAGNOSIS — M25.531 PAIN IN BOTH WRISTS: ICD-10-CM

## 2018-08-02 DIAGNOSIS — I10 ESSENTIAL HYPERTENSION: ICD-10-CM

## 2018-08-02 DIAGNOSIS — E87.6 HYPOKALEMIA: ICD-10-CM

## 2018-08-02 DIAGNOSIS — M25.532 PAIN IN BOTH WRISTS: ICD-10-CM

## 2018-08-02 DIAGNOSIS — Z12.5 PROSTATE CANCER SCREENING: ICD-10-CM

## 2018-08-02 DIAGNOSIS — E78.01 FAMILIAL HYPERCHOLESTEROLEMIA: ICD-10-CM

## 2018-08-02 PROCEDURE — 99214 OFFICE O/P EST MOD 30 MIN: CPT | Performed by: INTERNAL MEDICINE

## 2018-08-02 RX ORDER — LISINOPRIL 10 MG/1
10 TABLET ORAL DAILY
Qty: 30 TAB | Refills: 3 | Status: SHIPPED | OUTPATIENT
Start: 2018-08-02 | End: 2018-08-03 | Stop reason: CLARIF

## 2018-08-02 ASSESSMENT — PATIENT HEALTH QUESTIONNAIRE - PHQ9: CLINICAL INTERPRETATION OF PHQ2 SCORE: 0

## 2018-08-02 NOTE — ASSESSMENT & PLAN NOTE
Patient with a few years of aching and swelling in the wrists bilaterally.  Various trials of anti-inflammatories including Celebrex have not been helpful at all.  He does not have a true history of gout however is never been tested for this.  The pain is worse after a long days of work.  Sometimes sharp and shooting.  Sometimes burning along the back of the hands.  In the past this was felt to be clinically consistent with bursal cysts however now the edema is more generalized.

## 2018-08-02 NOTE — PROGRESS NOTES
Chief Complaint   Patient presents with   • Hypertension     FV labs   • Medication Refill       HISTORY OF PRESENT ILLNESS: Patient is a 63 y.o. male established patient who presents today to discuss the medical issues below.    Pain in both wrists  Patient with a few years of aching and swelling in the wrists bilaterally.  Various trials of anti-inflammatories including Celebrex have not been helpful at all.  He does not have a true history of gout however is never been tested for this.  The pain is worse after a long days of work.  Sometimes sharp and shooting.  Sometimes burning along the back of the hands.  In the past this was felt to be clinically consistent with bursal cysts however now the edema is more generalized.    HTN (hypertension)  Patient reports he is currently not watching blood pressure at home since it was making him crazy.  He is continuing on his medications as amlodipine 10 mg Prinzide 20/25 K-Dur 20 milliequivalents 6 tablets a day.  He feels great no chest pain palpitations edema he states since increasing his potassium he feels better in general that he has for quite a while.  He is taking magnesium 1 a day as well.  He has had no edema.  He does express some degree concern as in the past the hydrochlorothiazide was what ultimately control his blood pressure.    Hyperlipemia  Patient continues on simvastatin follows a reasonable diet no weight change.  He did have lab work done.      Patient Active Problem List    Diagnosis Date Noted   • HTN (hypertension) 01/03/2013     Priority: Medium     Class: Stage 1   • Hyperlipemia 01/03/2013     Priority: Low     Class: Chronic   • GERD (gastroesophageal reflux disease) 01/03/2013     Priority: Low     Class: Chronic   • Pain in both wrists 08/02/2018   • Other bursal cyst of wrist 12/15/2017   • Personal history of colonic polyps 06/15/2017   • Prostate cancer screening 01/08/2016   • Arrhythmia 01/06/2015       Allergies:Patient has no known  "allergies.    Current Outpatient Prescriptions   Medication Sig Dispense Refill   • lisinopril (PRINIVIL) 10 MG Tab Take 1 Tab by mouth every day. 30 Tab 3   • potassium chloride SA (KDUR) 20 MEQ Tab CR Take 2 Tabs by mouth 3 times a day. 540 Tab 3   • simvastatin (ZOCOR) 40 MG Tab TAKE ONE TABLET BY MOUTH EVERY EVENING 90 Tab 3   • amLODIPine (NORVASC) 10 MG Tab TAKE ONE TABLET BY MOUTH EVERY DAY 90 Tab 3   • carvedilol (COREG) 25 MG Tab Take 1 Tab by mouth 2 times a day, with meals. 180 Tab 3   • lansoprazole (PREVACID) 30 MG CAPSULE DELAYED RELEASE TAKE ONE CAPSULE BY MOUTH EVERY DAY 90 Cap 3     No current facility-administered medications for this visit.          Past Medical History:   Diagnosis Date   • GERD (gastroesophageal reflux disease)    • Hyperlipidemia    • Hypertension    • Personal history of colonic polyps 6/15/2017   • Prostate cancer screening 2016   • SVT (supraventricular tachycardia) (HCC)        Social History   Substance Use Topics   • Smoking status: Never Smoker   • Smokeless tobacco: Never Used   • Alcohol use No       Family Status   Relation Status   • Mo Alive   • Fa      Family History   Problem Relation Age of Onset   • Hypertension Mother    • Cancer Father 75        prostate       ROS:    Respiratory: Negative for cough, sputum production, shortness of breath or wheezing.    Cardiovascular: Negative for chest pain, palpitations, orthopnea, dyspnea with exertion or edema.   Gastrointestinal: Negative for GI upset, nausea, vomiting, abdominal pain, constipation or diarrhea.   Genitourinary: Negative for dysuria, urgency, hesitancy or frequency.       Exam:    Blood pressure 116/60, pulse 61, temperature 36.8 °C (98.3 °F), resp. rate 14, height 1.676 m (5' 5.98\"), weight 80.5 kg (177 lb 6.4 oz), SpO2 98 %.  General:  Well nourished, well developed male in NAD.  HENT: Normocephalic, bilateral TMs are intact, nasal and oral mucosa with no lesions,   Neck: Supple without " bruit. Thyroid is not enlarged.  Pulmonary: Clear to ausculation and percussion.  Normal effort. No rales, rhonchi, or wheezing.  Cardiovascular: Regular rate and rhythm without murmur.   Abdomen: Normal bowel sounds soft and nontender no palpable liver spleen bladder mass.  Extremities: No LE edema noted.  Neuro: Grossly nonfocal.  Psych: Alert and oriented to person, place, and time. Appropriate mood and conversation.    LABS: Results reviewed and discussed with the patient, questions answered.      This dictation was created using voice recognition software. I have made reasonable attempts to correct errors, however, errors of grammar and content may exist.          Assessment/Plan:    1. Essential hypertension  Blood pressures excellent.  My recheck is measuring 122/62.  Long discussion held regarding the implications of the hypokalemia.  Most likely due to the diuretic portion and blood pressure is well controlled.  At this time trial discontinuation of the hydrochlorothiazide.  Decreased potassium supplementation increased magnesium supplementation follow labs and clinically.  - BASIC METABOLIC PANEL; Future  - MAGNESIUM; Future    2. Pain in both wrists  Wrist with minimal diffuse fullness cannot entirely exclude effusion.  Will check a uric acid and labs.  Nonsteroidals and Joiner inhibitors in the past were not effective.  Follow-up after labs consideration for x-rays  - URIC A+ALICIA+RA QN+CRP+ASO    3. Familial hypercholesterolemia  LDL well controlled reviewed labs      4.  Hypokalemia  2.9 at last exam as above under hypertension    5.  Screening for prostate cancer  PSA results reviewed with patient.    Patient was seen for 25 minutes face to face of which more than 50% of the time was spent in counseling and coordination of care regarding the above problems.

## 2018-08-02 NOTE — ASSESSMENT & PLAN NOTE
Patient reports he is currently not watching blood pressure at home since it was making him crazy.  He is continuing on his medications as amlodipine 10 mg Prinzide 20/25 K-Dur 20 milliequivalents 6 tablets a day.  He feels great no chest pain palpitations edema he states since increasing his potassium he feels better in general that he has for quite a while.  He is taking magnesium 1 a day as well.  He has had no edema.  He does express some degree concern as in the past the hydrochlorothiazide was what ultimately control his blood pressure.

## 2018-08-02 NOTE — ASSESSMENT & PLAN NOTE
Patient continues on simvastatin follows a reasonable diet no weight change.  He did have lab work done.

## 2018-08-02 NOTE — PATIENT INSTRUCTIONS
Stop the hydrochlorothiazide.  Continue the amlodipine 10 mg a day, carvedilol 25 mg a day, lisinopril 20 mg a day (new prescription).  Cut the potassium to 4 a day.   magnesium 2 a day    Labs in 1 mo  bp monitor at office ok.

## 2018-08-03 ENCOUNTER — TELEPHONE (OUTPATIENT)
Dept: MEDICAL GROUP | Facility: PHYSICIAN GROUP | Age: 63
End: 2018-08-03

## 2018-08-03 DIAGNOSIS — I10 ESSENTIAL HYPERTENSION: ICD-10-CM

## 2018-08-03 RX ORDER — LISINOPRIL 20 MG/1
20 TABLET ORAL DAILY
Qty: 90 TAB | Refills: 1 | Status: SHIPPED | OUTPATIENT
Start: 2018-08-03 | End: 2018-08-07

## 2018-08-03 RX ORDER — LISINOPRIL 20 MG/1
20 TABLET ORAL DAILY
Qty: 30 TAB | Refills: 3 | Status: SHIPPED | OUTPATIENT
Start: 2018-08-03 | End: 2018-08-03 | Stop reason: CLARIF

## 2018-08-03 NOTE — TELEPHONE ENCOUNTER
He is absolutely correct, intent was to stay at the 20 mg dose, I have sent a new rx to the pharmacy.  Please let the pharmacy know .

## 2018-08-03 NOTE — TELEPHONE ENCOUNTER
1. Caller Name: Delilah                      Call Back Number: [phone]    2. Message: delilah called about pts lisinopril RX.  They stated that the patient was not aware that you were lowering his dose of lisinopril to 10 mgs.  He thought that he was taking 20 mgs?  Delilah just wanted to verify that you wanted to lower the dose to 10 mgs.  I didn't see anything about the lower dose in your notes.  The ptaient also wanted to see if you could send in a 90 day supply.  Please advise    3. Patient approves office to leave a detailed voicemail/MyChart message: N\A

## 2018-08-07 RX ORDER — LISINOPRIL 20 MG/1
20 TABLET ORAL 2 TIMES DAILY
Qty: 180 TAB | Refills: 1 | Status: SHIPPED | OUTPATIENT
Start: 2018-08-07 | End: 2019-01-23 | Stop reason: SDUPTHER

## 2018-09-12 ENCOUNTER — HOSPITAL ENCOUNTER (OUTPATIENT)
Dept: LAB | Facility: MEDICAL CENTER | Age: 63
End: 2018-09-12
Attending: INTERNAL MEDICINE
Payer: COMMERCIAL

## 2018-09-12 DIAGNOSIS — I10 ESSENTIAL HYPERTENSION: ICD-10-CM

## 2018-09-12 LAB
ANION GAP SERPL CALC-SCNC: 9 MMOL/L (ref 0–11.9)
BUN SERPL-MCNC: 15 MG/DL (ref 8–22)
CALCIUM SERPL-MCNC: 9.1 MG/DL (ref 8.5–10.5)
CHLORIDE SERPL-SCNC: 108 MMOL/L (ref 96–112)
CO2 SERPL-SCNC: 24 MMOL/L (ref 20–33)
CREAT SERPL-MCNC: 0.82 MG/DL (ref 0.5–1.4)
CRP SERPL HS-MCNC: 0.09 MG/DL (ref 0–0.75)
FASTING STATUS PATIENT QL REPORTED: NORMAL
GLUCOSE SERPL-MCNC: 101 MG/DL (ref 65–99)
MAGNESIUM SERPL-MCNC: 2 MG/DL (ref 1.5–2.5)
POTASSIUM SERPL-SCNC: 3.6 MMOL/L (ref 3.6–5.5)
RHEUMATOID FACT SER IA-ACNC: <10 IU/ML (ref 0–14)
SODIUM SERPL-SCNC: 141 MMOL/L (ref 135–145)
URATE SERPL-MCNC: 5 MG/DL (ref 2.5–8.3)

## 2018-09-12 PROCEDURE — 83735 ASSAY OF MAGNESIUM: CPT

## 2018-09-12 PROCEDURE — 86038 ANTINUCLEAR ANTIBODIES: CPT

## 2018-09-12 PROCEDURE — 36415 COLL VENOUS BLD VENIPUNCTURE: CPT

## 2018-09-12 PROCEDURE — 84550 ASSAY OF BLOOD/URIC ACID: CPT

## 2018-09-12 PROCEDURE — 86431 RHEUMATOID FACTOR QUANT: CPT

## 2018-09-12 PROCEDURE — 80048 BASIC METABOLIC PNL TOTAL CA: CPT

## 2018-09-12 PROCEDURE — 86060 ANTISTREPTOLYSIN O TITER: CPT

## 2018-09-12 PROCEDURE — 86140 C-REACTIVE PROTEIN: CPT

## 2018-09-14 LAB
ASO AB SERPL-ACNC: 98 IU/ML (ref 0–330)
NUCLEAR IGG SER QL IA: NORMAL

## 2018-09-20 ENCOUNTER — OFFICE VISIT (OUTPATIENT)
Dept: MEDICAL GROUP | Facility: PHYSICIAN GROUP | Age: 63
End: 2018-09-20
Payer: COMMERCIAL

## 2018-09-20 VITALS
SYSTOLIC BLOOD PRESSURE: 134 MMHG | HEIGHT: 66 IN | DIASTOLIC BLOOD PRESSURE: 90 MMHG | TEMPERATURE: 98.6 F | BODY MASS INDEX: 29.57 KG/M2 | HEART RATE: 60 BPM | WEIGHT: 184 LBS | OXYGEN SATURATION: 93 % | RESPIRATION RATE: 16 BRPM

## 2018-09-20 DIAGNOSIS — I48.92 ATRIAL FLUTTER, UNSPECIFIED TYPE (HCC): ICD-10-CM

## 2018-09-20 DIAGNOSIS — M25.531 PAIN IN BOTH WRISTS: ICD-10-CM

## 2018-09-20 DIAGNOSIS — I10 ESSENTIAL HYPERTENSION: ICD-10-CM

## 2018-09-20 DIAGNOSIS — M25.532 PAIN IN BOTH WRISTS: ICD-10-CM

## 2018-09-20 PROCEDURE — 99214 OFFICE O/P EST MOD 30 MIN: CPT | Performed by: INTERNAL MEDICINE

## 2018-09-20 RX ORDER — METHYLPREDNISOLONE 4 MG/1
TABLET ORAL
Qty: 1 KIT | Refills: 0 | Status: SHIPPED | OUTPATIENT
Start: 2018-09-20 | End: 2019-03-22

## 2018-09-20 NOTE — PROGRESS NOTES
Chief Complaint   Patient presents with   • Hypertension     FV on BP change       HISTORY OF PRESENT ILLNESS: Patient is a 63 y.o. male established patient who presents today to discuss the medical issues below.    Pain in both wrists  States doing ok, not using nsaids as they don't help hot water helps the most, in the past patient has not noticed substantial improvement with Voltaren or Celebrex.  Ibuprofen gives him mouth sores we avoid that.  Waxing and waning of the aching swelling is persistent most of the time.  Labs in the past negative for antinuclear antibody, uric acid or rheumatoid factor.    HTN (hypertension)  Patient is off the hctz, he has less heartburn, he is not following the bp at home.  Feels good no chest pain palpitations edema    Arrhythmia  Patient is doing well, he will have palpitations if low potassium, he has continued the potassium even with discontinue of the hctz, had labs, now low normal.        Patient Active Problem List    Diagnosis Date Noted   • Hypokalemia 08/02/2018     Priority: High   • Pain in both wrists 08/02/2018     Priority: Medium   • HTN (hypertension) 01/03/2013     Priority: Medium     Class: Stage 1   • Hyperlipemia 01/03/2013     Priority: Low     Class: Chronic   • GERD (gastroesophageal reflux disease) 01/03/2013     Priority: Low     Class: Chronic   • Other bursal cyst of wrist 12/15/2017   • Personal history of colonic polyps 06/15/2017   • Prostate cancer screening 01/08/2016   • Arrhythmia 01/06/2015       Allergies:Ibuprofen    Current Outpatient Prescriptions   Medication Sig Dispense Refill   • lisinopril (PRINIVIL) 20 MG Tab Take 1 Tab by mouth 2 times a day. 180 Tab 1   • potassium chloride SA (KDUR) 20 MEQ Tab CR Take 2 Tabs by mouth 3 times a day. 540 Tab 3   • simvastatin (ZOCOR) 40 MG Tab TAKE ONE TABLET BY MOUTH EVERY EVENING 90 Tab 3   • amLODIPine (NORVASC) 10 MG Tab TAKE ONE TABLET BY MOUTH EVERY DAY 90 Tab 3   • carvedilol (COREG) 25 MG  "Tab Take 1 Tab by mouth 2 times a day, with meals. 180 Tab 3   • lansoprazole (PREVACID) 30 MG CAPSULE DELAYED RELEASE TAKE ONE CAPSULE BY MOUTH EVERY DAY 90 Cap 3     No current facility-administered medications for this visit.          Past Medical History:   Diagnosis Date   • GERD (gastroesophageal reflux disease)    • Hyperlipidemia    • Hypertension    • Personal history of colonic polyps 6/15/2017   • Prostate cancer screening 2016   • SVT (supraventricular tachycardia) (HCC)        Social History   Substance Use Topics   • Smoking status: Never Smoker   • Smokeless tobacco: Never Used   • Alcohol use No       Family Status   Relation Status   • Mo Alive   • Fa      Family History   Problem Relation Age of Onset   • Hypertension Mother    • Cancer Father 75        prostate       ROS:    Respiratory: Negative for cough, sputum production, shortness of breath or wheezing.    Cardiovascular: Negative for chest pain, palpitations, orthopnea, dyspnea with exertion or edema.   Gastrointestinal: Negative for GI upset, nausea, vomiting, abdominal pain, constipation or diarrhea.   Genitourinary: Negative for dysuria, urgency, hesitancy or frequency.       Exam:    Blood pressure 134/90, pulse 60, temperature 37 °C (98.6 °F), resp. rate 16, height 1.676 m (5' 6\"), weight 83.5 kg (184 lb), SpO2 93 %.  General:  Well nourished, well developed male in NAD.  Pulmonary: Clear to ausculation and percussion.  Normal effort. No rales, rhonchi, or wheezing.  Cardiovascular: Regular rate and rhythm without murmur.   Abdomen: Normal bowel sounds soft and nontender no palpable liver spleen bladder mass.  Extremities: Diffuse edema wrists bilaterally no tenderness to palpation  Neuro: Grossly nonfocal.  Psych: Alert and oriented to person, place, and time. Appropriate mood and conversation.    LABS: Results reviewed and discussed with the patient, questions answered.      This dictation was created using voice " recognition software. I have made reasonable attempts to correct errors, however, errors of grammar and content may exist.          Assessment/Plan:    1. Pain in both wrists  Workup essentially negative.  Discussed differential could include RA negative rheumatoid arthritis and may respond to additional therapy.  We will go ahead and proceed to x-ray of the wrists referral to rheumatology for consultation.  Proceed to trial Medrol Dosepak.  - DX-WRIST-LIMITED 2- LEFT; Future  - DX-WRIST-LIMITED 2- RIGHT; Future  - REFERRAL TO RHEUMATOLOGY    2. Essential hypertension  Blood pressure well controlled off the diuretic.  His heartburn is much better and his potassium level is now low normal.  He is still remaining on high potassium supplementation.  Discussed with the patient trial discontinuation of the potassium and monitoring serum potassium levels versus monitoring symptomatically.  He pretty reliably has palpitations when his potassium is low.  Discussed double checking with labs and restart potassium as needed any evidence of hypokalemia.  Touched on workup for potassium wasting when not on a diuretic and we would proceed with this as needed hypokalemia off diuretic.  - BASIC METABOLIC PANEL; Future    3. Atrial flutter, unspecified type (HCC)  Clinically stable regular rhythm       Patient was seen for 25 minutes face to face of which more than 50% of the time was spent in counseling and coordination of care regarding the above problems.

## 2018-09-20 NOTE — ASSESSMENT & PLAN NOTE
Patient is doing well, he will have palpitations if low potassium, he has continued the potassium even with discontinue of the hctz, had labs, now low normal.

## 2018-09-24 ENCOUNTER — PATIENT MESSAGE (OUTPATIENT)
Dept: MEDICAL GROUP | Facility: PHYSICIAN GROUP | Age: 63
End: 2018-09-24

## 2018-10-03 ENCOUNTER — TELEPHONE (OUTPATIENT)
Dept: MEDICAL GROUP | Facility: PHYSICIAN GROUP | Age: 63
End: 2018-10-03

## 2018-10-03 DIAGNOSIS — M19.90 ARTHRITIS: ICD-10-CM

## 2018-10-03 NOTE — TELEPHONE ENCOUNTER
Please notify the patient that rheumatology is waiting for the xrays and wanted one additional lab test (I missed one!) Cyclic Citrullinated Peptide, so he needs to get drawn again for that one test and get the xray done prior to their scheduling his appointment.    If he got the xrays done at [facility] then we need to track down the results.

## 2019-01-03 ENCOUNTER — TELEPHONE (OUTPATIENT)
Dept: MEDICAL GROUP | Facility: PHYSICIAN GROUP | Age: 64
End: 2019-01-03

## 2019-01-04 ENCOUNTER — TELEPHONE (OUTPATIENT)
Dept: MEDICAL GROUP | Facility: PHYSICIAN GROUP | Age: 64
End: 2019-01-04

## 2019-01-23 DIAGNOSIS — I10 ESSENTIAL HYPERTENSION: ICD-10-CM

## 2019-01-23 RX ORDER — LISINOPRIL 20 MG/1
TABLET ORAL
Qty: 180 TAB | Refills: 1 | Status: SHIPPED | OUTPATIENT
Start: 2019-01-23 | End: 2019-03-22

## 2019-01-23 NOTE — TELEPHONE ENCOUNTER
Refill X 6 months, sent to pharmacy.Pt. Seen in the last 6 months per protocol.   Lab Results   Component Value Date/Time    SODIUM 141 09/12/2018 08:44 AM    POTASSIUM 3.6 09/12/2018 08:44 AM    CHLORIDE 108 09/12/2018 08:44 AM    CO2 24 09/12/2018 08:44 AM    GLUCOSE 101 (H) 09/12/2018 08:44 AM    BUN 15 09/12/2018 08:44 AM    CREATININE 0.82 09/12/2018 08:44 AM

## 2019-01-24 DIAGNOSIS — M25.531 PAIN IN BOTH WRISTS: ICD-10-CM

## 2019-01-24 DIAGNOSIS — M25.532 PAIN IN BOTH WRISTS: ICD-10-CM

## 2019-03-13 ENCOUNTER — PATIENT MESSAGE (OUTPATIENT)
Dept: MEDICAL GROUP | Facility: PHYSICIAN GROUP | Age: 64
End: 2019-03-13

## 2019-03-14 NOTE — TELEPHONE ENCOUNTER
From: Ulysses Olivas  To: Tiarra DE LEON M.D.  Sent: 3/13/2019 5:35 PM PDT  Subject: Non-Urgent Medical Question    I have labs done every year and then a visit after. I know I have labs and a visit this week but it has only been 6 months. My question is why 6 months. Doesn't make sense and I don't want to come if there is no real reason. Thanks

## 2019-03-15 ENCOUNTER — HOSPITAL ENCOUNTER (OUTPATIENT)
Dept: LAB | Facility: MEDICAL CENTER | Age: 64
End: 2019-03-15
Attending: INTERNAL MEDICINE
Payer: COMMERCIAL

## 2019-03-15 DIAGNOSIS — I10 ESSENTIAL HYPERTENSION: ICD-10-CM

## 2019-03-15 LAB
ANION GAP SERPL CALC-SCNC: 10 MMOL/L (ref 0–11.9)
BUN SERPL-MCNC: 11 MG/DL (ref 8–22)
CALCIUM SERPL-MCNC: 9.1 MG/DL (ref 8.5–10.5)
CHLORIDE SERPL-SCNC: 107 MMOL/L (ref 96–112)
CO2 SERPL-SCNC: 25 MMOL/L (ref 20–33)
CREAT SERPL-MCNC: 0.84 MG/DL (ref 0.5–1.4)
FASTING STATUS PATIENT QL REPORTED: NORMAL
GLUCOSE SERPL-MCNC: 97 MG/DL (ref 65–99)
POTASSIUM SERPL-SCNC: 3.3 MMOL/L (ref 3.6–5.5)
SODIUM SERPL-SCNC: 142 MMOL/L (ref 135–145)

## 2019-03-15 PROCEDURE — 36415 COLL VENOUS BLD VENIPUNCTURE: CPT

## 2019-03-15 PROCEDURE — 80048 BASIC METABOLIC PNL TOTAL CA: CPT

## 2019-03-22 ENCOUNTER — OFFICE VISIT (OUTPATIENT)
Dept: MEDICAL GROUP | Facility: PHYSICIAN GROUP | Age: 64
End: 2019-03-22
Payer: COMMERCIAL

## 2019-03-22 VITALS
OXYGEN SATURATION: 94 % | SYSTOLIC BLOOD PRESSURE: 146 MMHG | DIASTOLIC BLOOD PRESSURE: 100 MMHG | HEIGHT: 65 IN | WEIGHT: 185 LBS | RESPIRATION RATE: 16 BRPM | TEMPERATURE: 98.3 F | HEART RATE: 64 BPM | BODY MASS INDEX: 30.82 KG/M2

## 2019-03-22 DIAGNOSIS — I10 ESSENTIAL HYPERTENSION: ICD-10-CM

## 2019-03-22 DIAGNOSIS — M25.531 PAIN IN BOTH WRISTS: ICD-10-CM

## 2019-03-22 DIAGNOSIS — M25.532 PAIN IN BOTH WRISTS: ICD-10-CM

## 2019-03-22 DIAGNOSIS — Z98.890 HISTORY OF CARDIAC RADIOFREQUENCY ABLATION (RFA): ICD-10-CM

## 2019-03-22 DIAGNOSIS — I48.92 ATRIAL FLUTTER, UNSPECIFIED TYPE (HCC): ICD-10-CM

## 2019-03-22 DIAGNOSIS — E87.6 HYPOKALEMIA: ICD-10-CM

## 2019-03-22 PROCEDURE — 99214 OFFICE O/P EST MOD 30 MIN: CPT | Performed by: INTERNAL MEDICINE

## 2019-03-22 RX ORDER — TELMISARTAN 80 MG/1
80 TABLET ORAL DAILY
Qty: 30 TAB | Refills: 3 | Status: SHIPPED | OUTPATIENT
Start: 2019-03-22 | End: 2019-04-17 | Stop reason: SDUPTHER

## 2019-03-22 ASSESSMENT — PATIENT HEALTH QUESTIONNAIRE - PHQ9: CLINICAL INTERPRETATION OF PHQ2 SCORE: 0

## 2019-03-22 NOTE — ASSESSMENT & PLAN NOTE
S/p LA with no subsequent palpitations.  He has not been seen by cardiology for quite a while at his own choice.

## 2019-03-22 NOTE — ASSESSMENT & PLAN NOTE
Patient reports she has been feeling a bit more jittery recently.  He states this is how he feels when his potassium level is low.  He has been taking one half of the 20 mEq potassium.  Had lab work done.  No chest pain palpitations or edema.  No hot flashes sweats.  Is not following his blood pressure at home.

## 2019-03-22 NOTE — ASSESSMENT & PLAN NOTE
He admits to not following blood pressure at home.  He is taking his medications as amlodipine 10 mg twice daily carvedilol 25 mg twice daily and lisinopril 20 mg twice daily.  No headaches or visual changes

## 2019-03-22 NOTE — PROGRESS NOTES
Chief Complaint   Patient presents with   • Hypertension     lab results       HISTORY OF PRESENT ILLNESS: Patient is a 63 y.o. male established patient who presents today to discuss the medical issues below.    Arrhythmia  S/p LA with no subsequent palpitations.  He has not been seen by cardiology for quite a while at his own choice.    Hypokalemia  Patient reports she has been feeling a bit more jittery recently.  He states this is how he feels when his potassium level is low.  He has been taking one half of the 20 mEq potassium.  Had lab work done.  No chest pain palpitations or edema.  No hot flashes sweats.  Is not following his blood pressure at home.    HTN (hypertension)  He admits to not following blood pressure at home.  He is taking his medications as amlodipine 10 mg twice daily carvedilol 25 mg twice daily and lisinopril 20 mg twice daily.  No headaches or visual changes    Pain in both wrists  Patient reports he has been doing vibration massage to his wrists and they are much better      Patient Active Problem List    Diagnosis Date Noted   • Hypokalemia 08/02/2018     Priority: High   • Pain in both wrists 08/02/2018     Priority: Medium   • HTN (hypertension) 01/03/2013     Priority: Medium     Class: Stage 1   • Hyperlipemia 01/03/2013     Priority: Low     Class: Chronic   • GERD (gastroesophageal reflux disease) 01/03/2013     Priority: Low     Class: Chronic   • History of cardiac radiofrequency ablation (RFA) 03/22/2019   • Other bursal cyst of wrist 12/15/2017   • Personal history of colonic polyps 06/15/2017   • Prostate cancer screening 01/08/2016   • Arrhythmia 01/06/2015       Allergies:Ibuprofen    Current Outpatient Prescriptions   Medication Sig Dispense Refill   • telmisartan (MICARDIS) 80 MG Tab Take 1 Tab by mouth every day. 30 Tab 3   • lansoprazole (PREVACID) 30 MG CAPSULE DELAYED RELEASE Take one cap PO everyday  90 Cap 3   • potassium chloride SA (KDUR) 20 MEQ Tab CR Take 2  "Tabs by mouth 3 times a day. 540 Tab 3   • simvastatin (ZOCOR) 40 MG Tab TAKE ONE TABLET BY MOUTH EVERY EVENING 90 Tab 3   • amLODIPine (NORVASC) 10 MG Tab TAKE ONE TABLET BY MOUTH EVERY DAY 90 Tab 3   • carvedilol (COREG) 25 MG Tab Take 1 Tab by mouth 2 times a day, with meals. 180 Tab 3     No current facility-administered medications for this visit.          Past Medical History:   Diagnosis Date   • GERD (gastroesophageal reflux disease)    • Hyperlipidemia    • Hypertension    • Personal history of colonic polyps 6/15/2017   • Prostate cancer screening 2016   • SVT (supraventricular tachycardia) (HCC)        Social History   Substance Use Topics   • Smoking status: Never Smoker   • Smokeless tobacco: Never Used   • Alcohol use No       Family Status   Relation Status   • Mo Alive   • Fa      Family History   Problem Relation Age of Onset   • Hypertension Mother    • Cancer Father 75        prostate       ROS:    Respiratory: Negative for cough, sputum production, shortness of breath or wheezing.    Cardiovascular: Negative for chest pain, palpitations, orthopnea, dyspnea with exertion or edema.   Gastrointestinal: Negative for GI upset, nausea, vomiting, abdominal pain, constipation or diarrhea.   Genitourinary: Negative for dysuria, urgency, hesitancy or frequency.       Exam:    Blood pressure 146/100, pulse 64, temperature 36.8 °C (98.3 °F), temperature source Temporal, resp. rate 16, height 1.651 m (5' 5\"), weight 83.9 kg (185 lb), SpO2 94 %.  General:  Well nourished, well developed male in NAD.  Pulmonary: Clear to ausculation and percussion.  Normal effort. No rales, rhonchi, or wheezing.  Cardiovascular: Regular rate and rhythm without murmur.   Abdomen: Normal bowel sounds soft and nontender no palpable liver spleen bladder mass.  Extremities: No LE edema noted.  Neuro: Grossly nonfocal.  Psych: Alert and oriented to person, place, and time. Appropriate mood and conversation.    LABS: " Results reviewed and discussed with the patient, questions answered.      This dictation was created using voice recognition software. I have made reasonable attempts to correct errors, however, errors of grammar and content may exist.          Assessment/Plan:    1. Hypokalemia  Persisting hypokalemia.  Reviewed with the patient at length.  He is currently off of a diuretic but also on only minimal potassium supplementation.  Differential includes renal wasting versus poor absorption.  At this point will start to dissolve the K-Dur increase dose to 20 mEq a day, obtain urinary potassium and pH.  Blood pressure poorly controlled trial change to ARB from Ace.  - Basic Metabolic Panel; Future  - URINE POTASSIUM RANDOM; Future  - Fluid pH; Future    2. History of cardiac radiofrequency ablation (RFA)  Clinically stable    3. Atrial flutter, unspecified type (HCC)  Clinically stable    4. Essential hypertension  Blood pressure high he states not following regularly at home feels fine.  Monitor with trial change to telmisartan    5. Pain in both wrists  Improved with massage/vibration therapy.       Patient was seen for 25 minutes face to face of which more than 50% of the time was spent in counseling and coordination of care regarding the above problems.

## 2019-03-22 NOTE — PATIENT INSTRUCTIONS
Stop lisinopril  Start telmisartan 1 in am  Use 1 full potassium tablet partially disolved  bp check and labs with urine in 1 week  bp check 2 weeks    If persisting low potassium we will do additional test  If questions use the Silatronixt.

## 2019-04-03 ENCOUNTER — HOSPITAL ENCOUNTER (OUTPATIENT)
Dept: LAB | Facility: MEDICAL CENTER | Age: 64
End: 2019-04-03
Attending: INTERNAL MEDICINE
Payer: COMMERCIAL

## 2019-04-03 DIAGNOSIS — M25.532 PAIN IN BOTH WRISTS: ICD-10-CM

## 2019-04-03 DIAGNOSIS — E87.6 HYPOKALEMIA: ICD-10-CM

## 2019-04-03 DIAGNOSIS — M25.531 PAIN IN BOTH WRISTS: ICD-10-CM

## 2019-04-03 LAB
ANION GAP SERPL CALC-SCNC: 7 MMOL/L (ref 0–11.9)
BODY FLD TYPE: NORMAL
BUN SERPL-MCNC: 16 MG/DL (ref 8–22)
CALCIUM SERPL-MCNC: 8.6 MG/DL (ref 8.5–10.5)
CHLORIDE SERPL-SCNC: 105 MMOL/L (ref 96–112)
CO2 SERPL-SCNC: 24 MMOL/L (ref 20–33)
CREAT SERPL-MCNC: 0.88 MG/DL (ref 0.5–1.4)
GLUCOSE SERPL-MCNC: 119 MG/DL (ref 65–99)
PH FLD: 6 [PH]
POTASSIUM SERPL-SCNC: 3.3 MMOL/L (ref 3.6–5.5)
POTASSIUM UR-SCNC: 12 MMOL/L
SODIUM SERPL-SCNC: 136 MMOL/L (ref 135–145)

## 2019-04-03 PROCEDURE — 83986 ASSAY PH BODY FLUID NOS: CPT

## 2019-04-03 PROCEDURE — 80048 BASIC METABOLIC PNL TOTAL CA: CPT

## 2019-04-03 PROCEDURE — 86200 CCP ANTIBODY: CPT

## 2019-04-03 PROCEDURE — 84133 ASSAY OF URINE POTASSIUM: CPT

## 2019-04-03 PROCEDURE — 36415 COLL VENOUS BLD VENIPUNCTURE: CPT

## 2019-04-05 LAB — CCP IGG SERPL-ACNC: 8 UNITS (ref 0–19)

## 2019-04-12 ENCOUNTER — NON-PROVIDER VISIT (OUTPATIENT)
Dept: MEDICAL GROUP | Facility: PHYSICIAN GROUP | Age: 64
End: 2019-04-12
Payer: COMMERCIAL

## 2019-04-12 ENCOUNTER — TELEPHONE (OUTPATIENT)
Dept: MEDICAL GROUP | Facility: PHYSICIAN GROUP | Age: 64
End: 2019-04-12

## 2019-04-12 VITALS — DIASTOLIC BLOOD PRESSURE: 90 MMHG | SYSTOLIC BLOOD PRESSURE: 136 MMHG

## 2019-04-12 DIAGNOSIS — E87.6 HYPOKALEMIA: ICD-10-CM

## 2019-04-12 DIAGNOSIS — I10 ESSENTIAL HYPERTENSION: ICD-10-CM

## 2019-04-12 DIAGNOSIS — Z01.30 BLOOD PRESSURE CHECK: ICD-10-CM

## 2019-04-12 NOTE — TELEPHONE ENCOUNTER
Ulysses came in for a BP check today after starting Telmisartan 80 mgs.  He has been on it now for 2.5 weeks and he said that he feels good.  His readings here are     Vitals:    04/12/19 1416 04/12/19 1417   BP: 138/90 136/90   BP Location: Left arm Left arm   Patient Position: Sitting Sitting   BP Cuff Size: Large adult Large adult     Patient also said that he had his labs done and his potassium is still low.  He said that he has been eating foods with high potassium he is taking 1 potassium pill a day.  Please advise

## 2019-04-12 NOTE — NON-PROVIDER
Ulysses Olivas is a 63 y.o. male here for a non-provider visit for BP check    Vitals:    04/12/19 1416 04/12/19 1417   BP: 138/90 136/90   BP Location: Left arm Left arm   Patient Position: Sitting Sitting   BP Cuff Size: Large adult Large adult     If abnormal, was an in office provider notified today? Yes  Routed to PCP? Yes

## 2019-04-12 NOTE — TELEPHONE ENCOUNTER
Please let him know that the urine showed more potassium being dropped into the urine than we would expect for his low serum potassium.     The next step is a 24 hour urine collection and another blood draw in the am after sitting position x 30 min.    If this testing doesn't explain the low potassium then we need endocrinology to help me.  I have placed an order for endocrine consult, this can take 6 mos so I have placed today so we can get on the list.      I would like to collect the urine prior to any additional changes.

## 2019-04-17 ENCOUNTER — PATIENT MESSAGE (OUTPATIENT)
Dept: MEDICAL GROUP | Facility: PHYSICIAN GROUP | Age: 64
End: 2019-04-17

## 2019-04-17 RX ORDER — TELMISARTAN 80 MG/1
80 TABLET ORAL DAILY
Qty: 30 TAB | Refills: 0 | Status: CANCELLED | OUTPATIENT
Start: 2019-04-17

## 2019-04-17 NOTE — TELEPHONE ENCOUNTER
Was the patient seen in the last year in this department? Yes    Does patient have an active prescription for medications requested? No     Received Request Via: Pharmacy     Last OV 3/22/19  Labs 4/3/19

## 2019-06-24 ENCOUNTER — HOSPITAL ENCOUNTER (OUTPATIENT)
Dept: LAB | Facility: MEDICAL CENTER | Age: 64
End: 2019-06-24
Attending: INTERNAL MEDICINE
Payer: COMMERCIAL

## 2019-06-24 DIAGNOSIS — E87.6 HYPOKALEMIA: ICD-10-CM

## 2019-06-24 DIAGNOSIS — I10 ESSENTIAL HYPERTENSION: ICD-10-CM

## 2019-06-24 LAB
ANION GAP SERPL CALC-SCNC: 10 MMOL/L (ref 0–11.9)
BUN SERPL-MCNC: 13 MG/DL (ref 8–22)
CALCIUM SERPL-MCNC: 9.4 MG/DL (ref 8.5–10.5)
CHLORIDE SERPL-SCNC: 106 MMOL/L (ref 96–112)
CO2 SERPL-SCNC: 26 MMOL/L (ref 20–33)
CREAT SERPL-MCNC: 0.89 MG/DL (ref 0.5–1.4)
FASTING STATUS PATIENT QL REPORTED: NORMAL
GLUCOSE SERPL-MCNC: 94 MG/DL (ref 65–99)
POTASSIUM SERPL-SCNC: 3.3 MMOL/L (ref 3.6–5.5)
SODIUM SERPL-SCNC: 142 MMOL/L (ref 135–145)

## 2019-06-24 PROCEDURE — 80048 BASIC METABOLIC PNL TOTAL CA: CPT

## 2019-06-24 PROCEDURE — 36415 COLL VENOUS BLD VENIPUNCTURE: CPT

## 2019-07-08 ENCOUNTER — OFFICE VISIT (OUTPATIENT)
Dept: MEDICAL GROUP | Facility: PHYSICIAN GROUP | Age: 64
End: 2019-07-08
Payer: COMMERCIAL

## 2019-07-08 VITALS
OXYGEN SATURATION: 97 % | RESPIRATION RATE: 16 BRPM | DIASTOLIC BLOOD PRESSURE: 88 MMHG | WEIGHT: 177 LBS | BODY MASS INDEX: 29.49 KG/M2 | HEIGHT: 65 IN | SYSTOLIC BLOOD PRESSURE: 128 MMHG | TEMPERATURE: 97.8 F | HEART RATE: 72 BPM

## 2019-07-08 DIAGNOSIS — I10 ESSENTIAL HYPERTENSION: ICD-10-CM

## 2019-07-08 DIAGNOSIS — E87.6 HYPOKALEMIA: ICD-10-CM

## 2019-07-08 DIAGNOSIS — Z98.890 HISTORY OF CARDIAC RADIOFREQUENCY ABLATION (RFA): ICD-10-CM

## 2019-07-08 DIAGNOSIS — E78.5 HYPERLIPIDEMIA, UNSPECIFIED HYPERLIPIDEMIA TYPE: ICD-10-CM

## 2019-07-08 DIAGNOSIS — Z11.59 ENCOUNTER FOR HEPATITIS C SCREENING TEST FOR LOW RISK PATIENT: ICD-10-CM

## 2019-07-08 DIAGNOSIS — K21.00 REFLUX ESOPHAGITIS: ICD-10-CM

## 2019-07-08 DIAGNOSIS — Z12.5 PROSTATE CANCER SCREENING: ICD-10-CM

## 2019-07-08 PROCEDURE — 99214 OFFICE O/P EST MOD 30 MIN: CPT | Performed by: INTERNAL MEDICINE

## 2019-07-08 RX ORDER — LANSOPRAZOLE 30 MG/1
30 CAPSULE, DELAYED RELEASE ORAL DAILY
Qty: 90 CAP | Refills: 1 | Status: SHIPPED | OUTPATIENT
Start: 2019-07-08 | End: 2020-06-10

## 2019-07-08 NOTE — ASSESSMENT & PLAN NOTE
Patient reports he has been using some potassium citrate and is feeling better.  He did not do the urine test as he reports work up in the past was negative for specific diagnosis.  He estimates work up 10 years ago.

## 2019-07-08 NOTE — PROGRESS NOTES
Chief Complaint   Patient presents with   • Follow-Up     med refill       HISTORY OF PRESENT ILLNESS: Patient is a 64 y.o. male established patient who presents today to discuss the medical issues below.    Hypokalemia  Patient reports he has been using some potassium citrate and is feeling better.  He did not do the urine test as he reports work up in the past was negative for specific diagnosis.  He estimates work up 10 years ago.      HTN (hypertension)  Not following home readings.  states he feels great.        Patient Active Problem List    Diagnosis Date Noted   • Hypokalemia 08/02/2018     Priority: High   • Pain in both wrists 08/02/2018     Priority: Medium   • HTN (hypertension) 01/03/2013     Priority: Medium     Class: Stage 1   • Hyperlipemia 01/03/2013     Priority: Low     Class: Chronic   • GERD (gastroesophageal reflux disease) 01/03/2013     Priority: Low     Class: Chronic   • History of cardiac radiofrequency ablation (RFA) 03/22/2019   • Other bursal cyst of wrist 12/15/2017   • Personal history of colonic polyps 06/15/2017   • Prostate cancer screening 01/08/2016   • Arrhythmia 01/06/2015       Allergies:Patient has no known allergies.    Current Outpatient Prescriptions   Medication Sig Dispense Refill   • lansoprazole (PREVACID) 30 MG CAPSULE DELAYED RELEASE Take 1 Cap by mouth every day. 90 Cap 1   • telmisartan (MICARDIS) 80 MG Tab Take 1 Tab by mouth every day. 90 Tab 3   • simvastatin (ZOCOR) 40 MG Tab TAKE ONE TABLET BY MOUTH EVERY EVENING 90 Tab 3   • amLODIPine (NORVASC) 10 MG Tab TAKE ONE TABLET BY MOUTH EVERY DAY 90 Tab 3   • carvedilol (COREG) 25 MG Tab Take 1 Tab by mouth 2 times a day, with meals. 180 Tab 3   • potassium chloride SA (KDUR) 20 MEQ Tab CR Take 2 Tabs by mouth 3 times a day. (Patient not taking: Reported on 7/8/2019) 540 Tab 3     No current facility-administered medications for this visit.          Past Medical History:   Diagnosis Date   • GERD  "(gastroesophageal reflux disease)    • Hyperlipidemia    • Hypertension    • Personal history of colonic polyps 6/15/2017   • Prostate cancer screening 2016   • SVT (supraventricular tachycardia) (HCC)        Social History   Substance Use Topics   • Smoking status: Never Smoker   • Smokeless tobacco: Never Used   • Alcohol use No       Family Status   Relation Status   • Mo Alive   • Fa      Family History   Problem Relation Age of Onset   • Hypertension Mother    • Cancer Father 75        prostate       ROS:    Respiratory: Negative for cough, sputum production, shortness of breath or wheezing.    Cardiovascular: Negative for chest pain, palpitations, orthopnea, dyspnea with exertion or edema.   Gastrointestinal: Negative for GI upset, nausea, vomiting, abdominal pain, constipation or diarrhea.   Genitourinary: Negative for dysuria, urgency, hesitancy or frequency.       Exam:    /88 (BP Location: Left arm, Patient Position: Sitting)   Pulse 72   Temp 36.6 °C (97.8 °F)   Resp 16   Ht 1.651 m (5' 5\")   Wt 80.3 kg (177 lb)   SpO2 97%   General:  Well nourished, well developed male in NAD.  HENT: Normocephalic, bilateral TMs are intact, nasal and oral mucosa with no lesions,   Neck: Supple without bruit. Thyroid is not enlarged.  Pulmonary: Clear to ausculation and percussion.  Normal effort. No rales, rhonchi, or wheezing.  Cardiovascular: Regular rate and rhythm without murmur.   Abdomen: Normal bowel sounds soft and nontender no palpable liver spleen bladder mass.  Extremities: No LE edema noted.  Neuro: Grossly nonfocal.  Psych: Alert and oriented to person, place, and time. Appropriate mood and conversation.    LABS: Results reviewed and discussed with the patient, questions answered.    This dictation was created using voice recognition software. I have made reasonable attempts to correct errors, however, errors of grammar and content may exist.          Assessment/Plan:    1. " Hypokalemia  Long-standing problem for him.  Current labs coming in at 3.3 with persisting hypokalemia.  Patient subjectively feels much better with the change to potassium citrate supplement and did not get the urine assessment done as he felt it not necessary currently.  He states he sleeping well and generally he can tell if his potassium is low by palpitations at night and he is not been having any of those.  Generally his energy is good as well.  Patient has had endocrine work-up in the past approximately 10 years ago which was negative for any significant pathology.  Increasing probability that this is benign and potentially hereditary.  Monitor with ongoing potassium citrate.  Urine work-up and referral to endocrine PRN exacerbation.    2. Essential hypertension  Much improved continue meds refills written.  - Comp Metabolic Panel; Future  - CBC WITH DIFFERENTIAL; Future    3. Prostate cancer screening  At 6-month follow-up will be due for screening  - PROSTATE SPECIFIC AG SCREENING; Future    4. History of cardiac radiofrequency ablation (RFA)  Remaining stable completely asymptomatic since his ablation    5. Encounter for hepatitis C screening test for low risk patient  Discussed NIH recommendations we will add to labs  - HEP C VIRUS ANTIBODY; Future    6. Hyperlipidemia, unspecified hyperlipidemia type  Currently on simvastatin laboratory monitoring scheduled at follow-up  - Lipid Profile; Future    7. Reflux esophagitis  Stable as long as he stays on his Prevacid refills written.  - lansoprazole (PREVACID) 30 MG CAPSULE DELAYED RELEASE; Take 1 Cap by mouth every day.  Dispense: 90 Cap; Refill: 1       Patient was seen for 25 minutes face to face of which more than 50% of the time was spent in counseling and coordination of care regarding the above problems.

## 2019-07-29 DIAGNOSIS — E87.6 HYPOKALEMIA: ICD-10-CM

## 2019-07-29 DIAGNOSIS — I10 ESSENTIAL HYPERTENSION: ICD-10-CM

## 2019-07-30 RX ORDER — POTASSIUM CHLORIDE 20 MEQ/1
TABLET, EXTENDED RELEASE ORAL
Qty: 540 TAB | Refills: 1 | Status: SHIPPED | OUTPATIENT
Start: 2019-07-30

## 2019-07-30 NOTE — TELEPHONE ENCOUNTER
Was the patient seen in the last year in this department? Yes    Does patient have an active prescription for medications requested? No     Received Request Via: Pharmacy      Pt met protocol?: Yes pt last ov 7/19   Lab Results   Component Value Date/Time    SODIUM 142 06/24/2019 10:37 AM    POTASSIUM 3.3 (L) 06/24/2019 10:37 AM    CHLORIDE 106 06/24/2019 10:37 AM    CO2 26 06/24/2019 10:37 AM    GLUCOSE 94 06/24/2019 10:37 AM    BUN 13 06/24/2019 10:37 AM    CREATININE 0.89 06/24/2019 10:37 AM

## 2019-07-31 ENCOUNTER — TELEPHONE (OUTPATIENT)
Dept: MEDICAL GROUP | Facility: PHYSICIAN GROUP | Age: 64
End: 2019-07-31

## 2019-07-31 NOTE — TELEPHONE ENCOUNTER
1. Caller Name: rosemary                      Call Back Number: [phone]    2. Message: [facility] pharmacy called and said that they Telmisartan 80 mgs is on back order and they aren't sure when It will be back in stock.  They wanted to know if it would be ok if they dispensed telmisartan 40 mgs takeing 2 tabs a day dispense 180 tablets. Please advise      3. Patient approves office to leave a detailed voicemail/MyChart message: N\A

## 2019-11-02 ENCOUNTER — PATIENT MESSAGE (OUTPATIENT)
Dept: MEDICAL GROUP | Facility: PHYSICIAN GROUP | Age: 64
End: 2019-11-02

## 2020-01-07 ENCOUNTER — HOSPITAL ENCOUNTER (OUTPATIENT)
Dept: LAB | Facility: MEDICAL CENTER | Age: 65
End: 2020-01-07
Attending: INTERNAL MEDICINE
Payer: COMMERCIAL

## 2020-01-07 DIAGNOSIS — I10 ESSENTIAL HYPERTENSION: ICD-10-CM

## 2020-01-07 DIAGNOSIS — Z12.5 PROSTATE CANCER SCREENING: ICD-10-CM

## 2020-01-07 DIAGNOSIS — Z11.59 ENCOUNTER FOR HEPATITIS C SCREENING TEST FOR LOW RISK PATIENT: ICD-10-CM

## 2020-01-07 DIAGNOSIS — E78.5 HYPERLIPIDEMIA, UNSPECIFIED HYPERLIPIDEMIA TYPE: ICD-10-CM

## 2020-01-07 LAB
ALBUMIN SERPL BCP-MCNC: 4.2 G/DL (ref 3.2–4.9)
ALBUMIN/GLOB SERPL: 1.4 G/DL
ALP SERPL-CCNC: 58 U/L (ref 30–99)
ALT SERPL-CCNC: 25 U/L (ref 2–50)
ANION GAP SERPL CALC-SCNC: 9 MMOL/L (ref 0–11.9)
AST SERPL-CCNC: 24 U/L (ref 12–45)
BASOPHILS # BLD AUTO: 0.8 % (ref 0–1.8)
BASOPHILS # BLD: 0.05 K/UL (ref 0–0.12)
BILIRUB SERPL-MCNC: 0.8 MG/DL (ref 0.1–1.5)
BUN SERPL-MCNC: 16 MG/DL (ref 8–22)
CALCIUM SERPL-MCNC: 9.3 MG/DL (ref 8.5–10.5)
CHLORIDE SERPL-SCNC: 103 MMOL/L (ref 96–112)
CHOLEST SERPL-MCNC: 173 MG/DL (ref 100–199)
CO2 SERPL-SCNC: 25 MMOL/L (ref 20–33)
CREAT SERPL-MCNC: 0.81 MG/DL (ref 0.5–1.4)
EOSINOPHIL # BLD AUTO: 0.13 K/UL (ref 0–0.51)
EOSINOPHIL NFR BLD: 2.2 % (ref 0–6.9)
ERYTHROCYTE [DISTWIDTH] IN BLOOD BY AUTOMATED COUNT: 40.3 FL (ref 35.9–50)
FASTING STATUS PATIENT QL REPORTED: NORMAL
GLOBULIN SER CALC-MCNC: 2.9 G/DL (ref 1.9–3.5)
GLUCOSE SERPL-MCNC: 97 MG/DL (ref 65–99)
HCT VFR BLD AUTO: 44.3 % (ref 42–52)
HDLC SERPL-MCNC: 44 MG/DL
HGB BLD-MCNC: 15.1 G/DL (ref 14–18)
IMM GRANULOCYTES # BLD AUTO: 0.01 K/UL (ref 0–0.11)
IMM GRANULOCYTES NFR BLD AUTO: 0.2 % (ref 0–0.9)
LDLC SERPL CALC-MCNC: 102 MG/DL
LYMPHOCYTES # BLD AUTO: 2.4 K/UL (ref 1–4.8)
LYMPHOCYTES NFR BLD: 40.5 % (ref 22–41)
MCH RBC QN AUTO: 29.9 PG (ref 27–33)
MCHC RBC AUTO-ENTMCNC: 34.1 G/DL (ref 33.7–35.3)
MCV RBC AUTO: 87.7 FL (ref 81.4–97.8)
MONOCYTES # BLD AUTO: 0.62 K/UL (ref 0–0.85)
MONOCYTES NFR BLD AUTO: 10.5 % (ref 0–13.4)
NEUTROPHILS # BLD AUTO: 2.71 K/UL (ref 1.82–7.42)
NEUTROPHILS NFR BLD: 45.8 % (ref 44–72)
NRBC # BLD AUTO: 0 K/UL
NRBC BLD-RTO: 0 /100 WBC
PLATELET # BLD AUTO: 238 K/UL (ref 164–446)
PMV BLD AUTO: 10.7 FL (ref 9–12.9)
POTASSIUM SERPL-SCNC: 3.5 MMOL/L (ref 3.6–5.5)
PROT SERPL-MCNC: 7.1 G/DL (ref 6–8.2)
RBC # BLD AUTO: 5.05 M/UL (ref 4.7–6.1)
SODIUM SERPL-SCNC: 137 MMOL/L (ref 135–145)
TRIGL SERPL-MCNC: 135 MG/DL (ref 0–149)
WBC # BLD AUTO: 5.9 K/UL (ref 4.8–10.8)

## 2020-01-07 PROCEDURE — 80053 COMPREHEN METABOLIC PANEL: CPT

## 2020-01-07 PROCEDURE — 86803 HEPATITIS C AB TEST: CPT

## 2020-01-07 PROCEDURE — 85025 COMPLETE CBC W/AUTO DIFF WBC: CPT

## 2020-01-07 PROCEDURE — 80061 LIPID PANEL: CPT

## 2020-01-07 PROCEDURE — 36415 COLL VENOUS BLD VENIPUNCTURE: CPT

## 2020-01-07 PROCEDURE — 84153 ASSAY OF PSA TOTAL: CPT

## 2020-01-08 LAB
HCV AB SER QL: NEGATIVE
PSA SERPL-MCNC: 2.46 NG/ML (ref 0–4)

## 2020-02-10 ENCOUNTER — OFFICE VISIT (OUTPATIENT)
Dept: MEDICAL GROUP | Facility: PHYSICIAN GROUP | Age: 65
End: 2020-02-10
Payer: COMMERCIAL

## 2020-02-10 VITALS
WEIGHT: 179 LBS | RESPIRATION RATE: 14 BRPM | BODY MASS INDEX: 29.82 KG/M2 | OXYGEN SATURATION: 96 % | HEART RATE: 64 BPM | HEIGHT: 65 IN | TEMPERATURE: 97.4 F | DIASTOLIC BLOOD PRESSURE: 80 MMHG | SYSTOLIC BLOOD PRESSURE: 142 MMHG

## 2020-02-10 DIAGNOSIS — I10 ESSENTIAL HYPERTENSION: ICD-10-CM

## 2020-02-10 DIAGNOSIS — E78.01 FAMILIAL HYPERCHOLESTEROLEMIA: ICD-10-CM

## 2020-02-10 DIAGNOSIS — E87.6 HYPOKALEMIA: ICD-10-CM

## 2020-02-10 PROCEDURE — 99214 OFFICE O/P EST MOD 30 MIN: CPT | Performed by: INTERNAL MEDICINE

## 2020-02-10 RX ORDER — CARVEDILOL 25 MG/1
25 TABLET ORAL 3 TIMES DAILY
Qty: 270 TAB | Refills: 3 | Status: SHIPPED | OUTPATIENT
Start: 2020-02-10 | End: 2020-12-21 | Stop reason: SDUPTHER

## 2020-02-10 RX ORDER — CARVEDILOL 25 MG/1
25 TABLET ORAL 3 TIMES DAILY
Qty: 90 TAB | Refills: 3 | Status: SHIPPED | OUTPATIENT
Start: 2020-02-10 | End: 2020-02-10 | Stop reason: SDUPTHER

## 2020-02-10 ASSESSMENT — PATIENT HEALTH QUESTIONNAIRE - PHQ9: CLINICAL INTERPRETATION OF PHQ2 SCORE: 0

## 2020-02-10 NOTE — PROGRESS NOTES
Chief Complaint   Patient presents with   • Lab Results       HISTORY OF PRESENT ILLNESS: Patient is a 64 y.o. male established patient who presents today to discuss the medical issues below.    HTN (hypertension)  Patient has reported bp remained high so he self increased his coreg to 25 mg TID.  He continues to decline cardiology.  Has endocrine evaluation in     Hypokalemia  Patient currently taking potasium citrate and takes 3 TBS daily.        Patient Active Problem List    Diagnosis Date Noted   • Hypokalemia 08/02/2018     Priority: High   • Pain in both wrists 08/02/2018     Priority: Medium   • HTN (hypertension) 01/03/2013     Priority: Medium     Class: Stage 1   • Hyperlipemia 01/03/2013     Priority: Low     Class: Chronic   • GERD (gastroesophageal reflux disease) 01/03/2013     Priority: Low     Class: Chronic   • History of cardiac radiofrequency ablation (RFA) 03/22/2019   • Other bursal cyst of wrist 12/15/2017   • Personal history of colonic polyps 06/15/2017   • Prostate cancer screening 01/08/2016   • Arrhythmia 01/06/2015       Allergies:Patient has no known allergies.    Current Outpatient Medications   Medication Sig Dispense Refill   • carvedilol (COREG) 25 MG Tab Take 1 Tab by mouth 3 times a day. 270 Tab 3   • potassium chloride SA (KDUR) 20 MEQ Tab CR TAKE TWO TABLETS BY MOUTH THREE TIMES A  Tab 1   • simvastatin (ZOCOR) 40 MG Tab TAKE ONE TABLET BY MOUTH EVERY EVENING 90 Tab 3   • amLODIPine (NORVASC) 10 MG Tab TAKE ONE TABLET BY MOUTH EVERY DAY 90 Tab 3   • lansoprazole (PREVACID) 30 MG CAPSULE DELAYED RELEASE Take 1 Cap by mouth every day. 90 Cap 1   • telmisartan (MICARDIS) 80 MG Tab Take 1 Tab by mouth every day. 90 Tab 3     No current facility-administered medications for this visit.          Past Medical History:   Diagnosis Date   • GERD (gastroesophageal reflux disease)    • Hyperlipidemia    • Hypertension    • Personal history of colonic polyps 6/15/2017   • Prostate  "cancer screening 2016   • SVT (supraventricular tachycardia) (HCC)        Social History     Tobacco Use   • Smoking status: Never Smoker   • Smokeless tobacco: Never Used   Substance Use Topics   • Alcohol use: No     Alcohol/week: 0.0 oz   • Drug use: No       Family Status   Relation Name Status   • Mo  Alive   • Fa       Family History   Problem Relation Age of Onset   • Hypertension Mother    • Cancer Father 75        prostate       ROS:    Respiratory: Negative for cough, sputum production, shortness of breath or wheezing.    Cardiovascular: Negative for chest pain, palpitations, orthopnea, dyspnea with exertion or edema.   Gastrointestinal: Negative for GI upset, nausea, vomiting, abdominal pain, constipation or diarrhea.   Genitourinary: Negative for dysuria, urgency, hesitancy or frequency.       Exam:    /80 (BP Location: Right arm, Patient Position: Sitting, BP Cuff Size: Adult)   Pulse 64   Temp 36.3 °C (97.4 °F)   Resp 14   Ht 1.651 m (5' 5\")   Wt 81.2 kg (179 lb)   SpO2 96%  Body mass index is 29.79 kg/m².  General:  Well nourished, well developed male in NAD.  HENT: Normocephalic, bilateral TMs are intact, nasal and oral mucosa with no lesions,   Neck: Supple without bruit. Thyroid is not enlarged.  Pulmonary: Clear to ausculation and percussion.  Normal effort. No rales, rhonchi, or wheezing.  Cardiovascular: Regular rate and rhythm without murmur.   Abdomen: Normal bowel sounds soft and nontender no palpable liver spleen bladder mass.  Extremities: No LE edema noted.  Neuro: Grossly nonfocal.  Psych: Alert and oriented to person, place, and time. Appropriate mood and conversation.    LABS: Results reviewed and discussed with the patient, questions answered.      This dictation was created using voice recognition software. I have made reasonable attempts to correct errors, however, errors of grammar and content may exist.          Assessment/Plan:    1. Essential " hypertension  Once again discussed cardiology referral for maximum medical management of his essential hypertension.  He is doing fairly well with increasing the Coreg to higher than recommended doses, this is reviewed with the patient.  He declines referral to cardiology unless his blood pressure is not maintained with this regime.  He agrees to close my chart follow-up otherwise 6 months.  Reviewed with patient cardiovascular risks that will not necessarily be antedated by not feeling well.  He states understanding.  - Comp Metabolic Panel; Future  - CBC WITH DIFFERENTIAL; Future    2. Hypokalemia  Maintains on an clear dose of potassium supplementation.  Discussed magnesium augmentation ongoing monitoring.  Longstanding problem  - MAGNESIUM; Future    3. Familial hypercholesterolemia  Lipids well controlled borderline at the 103 range continues on simvastatin ongoing lab monitoring.  - Lipid Profile; Future       Patient was seen for 25 minutes face to face of which more than 50% of the time was spent in counseling and coordination of care regarding the above problems.

## 2020-02-10 NOTE — ASSESSMENT & PLAN NOTE
Patient has reported bp remained high so he self increased his coreg to 25 mg TID.  He continues to decline cardiology.  Has endocrine evaluation in

## 2020-05-08 RX ORDER — TELMISARTAN 80 MG/1
80 TABLET ORAL DAILY
Qty: 90 TAB | Refills: 0 | Status: SHIPPED | OUTPATIENT
Start: 2020-05-08 | End: 2020-08-05 | Stop reason: SDUPTHER

## 2020-06-09 DIAGNOSIS — K21.00 REFLUX ESOPHAGITIS: ICD-10-CM

## 2020-06-09 DIAGNOSIS — I10 ESSENTIAL HYPERTENSION: ICD-10-CM

## 2020-06-10 RX ORDER — LANSOPRAZOLE 30 MG/1
CAPSULE, DELAYED RELEASE ORAL
Qty: 90 CAP | Refills: 1 | Status: SHIPPED | OUTPATIENT
Start: 2020-06-10 | End: 2020-12-21 | Stop reason: SDUPTHER

## 2020-06-10 RX ORDER — AMLODIPINE BESYLATE 10 MG/1
TABLET ORAL
Qty: 90 TAB | Refills: 1 | Status: SHIPPED | OUTPATIENT
Start: 2020-06-10 | End: 2020-12-21 | Stop reason: SDUPTHER

## 2020-06-10 NOTE — TELEPHONE ENCOUNTER
Last Blood Pressure reading was 142/80 on 02/10/2020, Above the age of 61  Last seen by PCP 02/10/2020. Will send 6 month(s) to the pharmacy.

## 2020-08-05 RX ORDER — TELMISARTAN 80 MG/1
80 TABLET ORAL DAILY
Qty: 90 TAB | Refills: 3 | Status: SHIPPED | OUTPATIENT
Start: 2020-08-05 | End: 2020-12-21 | Stop reason: SDUPTHER

## 2020-08-05 NOTE — TELEPHONE ENCOUNTER
Received request via: Pharmacy    Was the patient seen in the last year in this department? Yes    Does the patient have an active prescription (recently filled or refills available) for medication(s) requested? No     Last OV 02/10/2020

## 2020-08-06 ENCOUNTER — HOSPITAL ENCOUNTER (OUTPATIENT)
Dept: LAB | Facility: MEDICAL CENTER | Age: 65
End: 2020-08-06
Attending: INTERNAL MEDICINE
Payer: COMMERCIAL

## 2020-08-06 DIAGNOSIS — E78.01 FAMILIAL HYPERCHOLESTEROLEMIA: ICD-10-CM

## 2020-08-06 DIAGNOSIS — I10 ESSENTIAL HYPERTENSION: ICD-10-CM

## 2020-08-06 DIAGNOSIS — E87.6 HYPOKALEMIA: ICD-10-CM

## 2020-08-06 LAB
ALBUMIN SERPL BCP-MCNC: 4.4 G/DL (ref 3.2–4.9)
ALBUMIN/GLOB SERPL: 1.7 G/DL
ALP SERPL-CCNC: 69 U/L (ref 30–99)
ALT SERPL-CCNC: 13 U/L (ref 2–50)
ANION GAP SERPL CALC-SCNC: 14 MMOL/L (ref 7–16)
AST SERPL-CCNC: 17 U/L (ref 12–45)
BASOPHILS # BLD AUTO: 1 % (ref 0–1.8)
BASOPHILS # BLD: 0.07 K/UL (ref 0–0.12)
BILIRUB SERPL-MCNC: 0.5 MG/DL (ref 0.1–1.5)
BUN SERPL-MCNC: 16 MG/DL (ref 8–22)
CALCIUM SERPL-MCNC: 9.1 MG/DL (ref 8.5–10.5)
CHLORIDE SERPL-SCNC: 105 MMOL/L (ref 96–112)
CHOLEST SERPL-MCNC: 229 MG/DL (ref 100–199)
CO2 SERPL-SCNC: 21 MMOL/L (ref 20–33)
CREAT SERPL-MCNC: 0.72 MG/DL (ref 0.5–1.4)
EOSINOPHIL # BLD AUTO: 0.21 K/UL (ref 0–0.51)
EOSINOPHIL NFR BLD: 3.1 % (ref 0–6.9)
ERYTHROCYTE [DISTWIDTH] IN BLOOD BY AUTOMATED COUNT: 42.4 FL (ref 35.9–50)
FASTING STATUS PATIENT QL REPORTED: NORMAL
GLOBULIN SER CALC-MCNC: 2.6 G/DL (ref 1.9–3.5)
GLUCOSE SERPL-MCNC: 100 MG/DL (ref 65–99)
HCT VFR BLD AUTO: 44 % (ref 42–52)
HDLC SERPL-MCNC: 41 MG/DL
HGB BLD-MCNC: 14.5 G/DL (ref 14–18)
IMM GRANULOCYTES # BLD AUTO: 0.01 K/UL (ref 0–0.11)
IMM GRANULOCYTES NFR BLD AUTO: 0.1 % (ref 0–0.9)
LDLC SERPL CALC-MCNC: 159 MG/DL
LYMPHOCYTES # BLD AUTO: 2.79 K/UL (ref 1–4.8)
LYMPHOCYTES NFR BLD: 41 % (ref 22–41)
MAGNESIUM SERPL-MCNC: 2 MG/DL (ref 1.5–2.5)
MCH RBC QN AUTO: 30 PG (ref 27–33)
MCHC RBC AUTO-ENTMCNC: 33 G/DL (ref 33.7–35.3)
MCV RBC AUTO: 90.9 FL (ref 81.4–97.8)
MONOCYTES # BLD AUTO: 0.69 K/UL (ref 0–0.85)
MONOCYTES NFR BLD AUTO: 10.1 % (ref 0–13.4)
NEUTROPHILS # BLD AUTO: 3.03 K/UL (ref 1.82–7.42)
NEUTROPHILS NFR BLD: 44.7 % (ref 44–72)
NRBC # BLD AUTO: 0 K/UL
NRBC BLD-RTO: 0 /100 WBC
PLATELET # BLD AUTO: 242 K/UL (ref 164–446)
PMV BLD AUTO: 10.9 FL (ref 9–12.9)
POTASSIUM SERPL-SCNC: 3.7 MMOL/L (ref 3.6–5.5)
PROT SERPL-MCNC: 7 G/DL (ref 6–8.2)
RBC # BLD AUTO: 4.84 M/UL (ref 4.7–6.1)
SODIUM SERPL-SCNC: 140 MMOL/L (ref 135–145)
TRIGL SERPL-MCNC: 147 MG/DL (ref 0–149)
WBC # BLD AUTO: 6.8 K/UL (ref 4.8–10.8)

## 2020-08-06 PROCEDURE — 36415 COLL VENOUS BLD VENIPUNCTURE: CPT

## 2020-08-06 PROCEDURE — 83735 ASSAY OF MAGNESIUM: CPT

## 2020-08-06 PROCEDURE — 85025 COMPLETE CBC W/AUTO DIFF WBC: CPT

## 2020-08-06 PROCEDURE — 80053 COMPREHEN METABOLIC PANEL: CPT

## 2020-08-06 PROCEDURE — 80061 LIPID PANEL: CPT

## 2020-08-17 ENCOUNTER — OFFICE VISIT (OUTPATIENT)
Dept: MEDICAL GROUP | Facility: PHYSICIAN GROUP | Age: 65
End: 2020-08-17
Payer: COMMERCIAL

## 2020-08-17 VITALS
RESPIRATION RATE: 12 BRPM | DIASTOLIC BLOOD PRESSURE: 78 MMHG | SYSTOLIC BLOOD PRESSURE: 118 MMHG | BODY MASS INDEX: 29.03 KG/M2 | HEART RATE: 61 BPM | HEIGHT: 67 IN | OXYGEN SATURATION: 97 % | WEIGHT: 185 LBS | TEMPERATURE: 98.8 F

## 2020-08-17 DIAGNOSIS — I48.92 ATRIAL FLUTTER, UNSPECIFIED TYPE (HCC): ICD-10-CM

## 2020-08-17 DIAGNOSIS — I10 ESSENTIAL HYPERTENSION: ICD-10-CM

## 2020-08-17 DIAGNOSIS — K21.9 GASTROESOPHAGEAL REFLUX DISEASE WITHOUT ESOPHAGITIS: ICD-10-CM

## 2020-08-17 DIAGNOSIS — E78.01 FAMILIAL HYPERCHOLESTEROLEMIA: ICD-10-CM

## 2020-08-17 PROCEDURE — 99214 OFFICE O/P EST MOD 30 MIN: CPT | Performed by: INTERNAL MEDICINE

## 2020-08-17 ASSESSMENT — FIBROSIS 4 INDEX: FIB4 SCORE: 1.25

## 2020-08-17 NOTE — NON-PROVIDER
"  Chief Complaint   Patient presents with   • Hypertension   • Medication Refill       HISTORY OF PRESENT ILLNESS: Patient is a 65 y.o. male established patient who presents today to discuss the medical issues below.    1. Essential hypertension  Pt BP today 118/78, he continues on his Norvasc, coreg, and telmisartan. Pt does not take his BP at home.       2. Familial hypercholesterolemia  Stopped taking 4 months ago and restarted it 2 day ago. Pt thought that it may have be causing pain in his wrist however, it didn't change the pain.     3. Gastroesophageal reflux disease without esophagitis  Pt states that he is taking bake soda 1/4 tsp every other day for his GERD and 1/2 of a prevacid daily. Asked why he is only taking 1/2 of a prevacid and he said \"I don't like taking medications\". Last upper/lower IG scope was about 15 yrs ago and states that he will have one again in 8 months when he gets on medicare.          4. Atrial flutter, unspecified type (HCC)  Pt statst that it has been over 4 months since his last palpation or irregularity since he started potassium citrate. Pt has not seen a cardiology in \"many years\".      5. Hypokalemia  Patient states that he has a long history of hypokalemia that he has \"had many test and dr treat it\". Pt states that he was taking potassium chloride for about 15 yrs and still has always had low K. He switched to potassium citrate power OTC morning and night. Potassium level was 3.7 8/6/2020 up from 3.5 1/7/2020. Pt states his heart palpitation/irraulagities are now gone since he started the potassium citrate.       Patient Active Problem List    Diagnosis Date Noted   • Hypokalemia 08/02/2018     Priority: High   • Pain in both wrists 08/02/2018     Priority: Medium   • Hyperlipemia 01/03/2013     Priority: Low     Class: Chronic   • GERD (gastroesophageal reflux disease) 01/03/2013     Priority: Low     Class: Chronic   • History of cardiac radiofrequency ablation (RFA) " 2019   • Other bursal cyst of wrist 12/15/2017   • Personal history of colonic polyps 06/15/2017   • Prostate cancer screening 2016   • Arrhythmia 2015       Allergies:Patient has no known allergies.    Current Outpatient Medications   Medication Sig Dispense Refill   • telmisartan (MICARDIS) 80 MG Tab Take 1 Tab by mouth every day. 90 Tab 3   • lansoprazole (PREVACID) 30 MG CAPSULE DELAYED RELEASE TAKE ONE CAPSULE BY MOUTH EVERY DAY 90 Cap 1   • amLODIPine (NORVASC) 10 MG Tab TAKE ONE TABLET BY MOUTH EVERY DAY 90 Tab 1   • carvedilol (COREG) 25 MG Tab Take 1 Tab by mouth 3 times a day. 270 Tab 3   • simvastatin (ZOCOR) 40 MG Tab TAKE ONE TABLET BY MOUTH EVERY EVENING 90 Tab 3   • potassium chloride SA (KDUR) 20 MEQ Tab CR TAKE TWO TABLETS BY MOUTH THREE TIMES A DAY (Patient not taking: Reported on 2020) 540 Tab 1     No current facility-administered medications for this visit.          Past Medical History:   Diagnosis Date   • GERD (gastroesophageal reflux disease)    • Hyperlipidemia    • Hypertension    • Personal history of colonic polyps 6/15/2017   • Prostate cancer screening 2016   • SVT (supraventricular tachycardia) (HCC)        Social History     Tobacco Use   • Smoking status: Never Smoker   • Smokeless tobacco: Never Used   Substance Use Topics   • Alcohol use: No     Alcohol/week: 0.0 oz   • Drug use: No       Family Status   Relation Name Status   • Mo  Alive   • Fa       Family History   Problem Relation Age of Onset   • Hypertension Mother    • Cancer Father 75        prostate       ROS:    Respiratory: Negative for cough, sputum production, shortness of breath or wheezing.    Cardiovascular: Negative for chest pain, palpitations, orthopnea, dyspnea with exertion or edema.   Gastrointestinal: Negative for GI upset, nausea, vomiting, abdominal pain, constipation or diarrhea.   Genitourinary: Negative for dysuria, urgency, hesitancy or frequency.       Exam:    BP  "118/78 (BP Location: Left arm, Patient Position: Sitting, BP Cuff Size: Adult)   Pulse 61   Temp 37.1 °C (98.8 °F) (Temporal)   Resp 12   Ht 1.702 m (5' 7\")   Wt 83.9 kg (185 lb)   SpO2 97%  Body mass index is 28.98 kg/m².  General:  Well nourished, well developed male in NAD.  Pulmonary: Thorax is symmetric with good equal bilateral expansion. No use of accessory muscles or evidence of retractions. Breath sounds vesicular with no crackles or rhonchi noted. All lung fields resonant without evidence of consolidation. No bronchophony.   Cardiovascular: The heart is regular rate and rhythm. There’s no Murmurs rubs or galops. S1 and S2 are present. There is no S3 heart. There is no S4. The PMI is at the 5th intercostal space at the midclavicular line.   Abdomen:  soft and nontender  Extremities: No LE edema noted.  Neuro: Grossly nonfocal.  Psych: Alert and oriented to person, place, and time. Appropriate mood and conversation.        This dictation was created using voice recognition software. I have made reasonable attempts to correct errors, however, errors of grammar and content may exist.          Assessment/Plan:    1. Essential hypertension  Patient will continue to take his amlodipine as well as Coreg for blood pressure management control.  Diet and exercise discussed.    2. Familial hypercholesterolemia   Patient was encouraged to stay on his simvastatin 40 mg tablets daily as prescribed to help reduce cholesterol and by cardiovascular protection.  Patient was also encouraged to follow a healthy diet that is low in cholesterol as well as salt.  In-depth discussion about rising total cholesterol and LDL due to the 4 months that he was off his simvastatin.We will continue to monitor, follow-up in 6 months with blood work.    3. Gastroesophageal reflux disease without esophagitis  Patient was encouraged to trial Prevacid daily for at least 6 weeks without use of baking soda to see if his acid reflux " improves.  Patient was educated on risks of continued acid reflux and possible risk of Brady's esophagus.  Recommendation was made that if symptoms do not improve in 6 weeks to have a GI referral possible an upper and lower endoscopy.  Patient did indicate that he is waiting till his Medicare starts in 8 months have a colonoscopy.  Patient has denied referral to GI specialist at this time or trial of new medication.  We will continue to monitor at follow-up appointment in 6 months per patient's request    4. Atrial flutter, unspecified type (HCC)  We will continue to monitor for atrial flutter events.     5. Hypokalemia  Patient will continue take his potassium citrate as his potassium levels have improved.  Patient was educated on the risks of hypokalemia and signs and symptoms to monitor for.  We will continue to monitor with blood work in 6 months.     Patient was seen for 25 minutes face to face of which more than 50% of the time was spent in counseling and coordination of care regarding the above problems.

## 2020-12-21 ENCOUNTER — PATIENT MESSAGE (OUTPATIENT)
Dept: MEDICAL GROUP | Facility: PHYSICIAN GROUP | Age: 65
End: 2020-12-21

## 2020-12-21 DIAGNOSIS — I10 ESSENTIAL HYPERTENSION: ICD-10-CM

## 2020-12-21 DIAGNOSIS — E78.5 HYPERLIPIDEMIA, UNSPECIFIED HYPERLIPIDEMIA TYPE: ICD-10-CM

## 2020-12-21 DIAGNOSIS — K21.00 REFLUX ESOPHAGITIS: ICD-10-CM

## 2020-12-21 RX ORDER — SIMVASTATIN 40 MG
TABLET ORAL
Qty: 90 TAB | Refills: 1 | Status: SHIPPED | OUTPATIENT
Start: 2020-12-21 | End: 2021-06-08 | Stop reason: SDUPTHER

## 2020-12-21 RX ORDER — TELMISARTAN 80 MG/1
80 TABLET ORAL DAILY
Qty: 90 TAB | Refills: 1 | Status: SHIPPED | OUTPATIENT
Start: 2020-12-21 | End: 2021-06-08 | Stop reason: SDUPTHER

## 2020-12-21 RX ORDER — LANSOPRAZOLE 30 MG/1
30 CAPSULE, DELAYED RELEASE ORAL
Qty: 90 CAP | Refills: 1 | Status: SHIPPED | OUTPATIENT
Start: 2020-12-21 | End: 2021-06-08 | Stop reason: SDUPTHER

## 2020-12-21 RX ORDER — CARVEDILOL 25 MG/1
25 TABLET ORAL 3 TIMES DAILY
Qty: 270 TAB | Refills: 1 | Status: SHIPPED | OUTPATIENT
Start: 2020-12-21 | End: 2021-06-07

## 2020-12-21 RX ORDER — AMLODIPINE BESYLATE 10 MG/1
10 TABLET ORAL
Qty: 90 TAB | Refills: 1 | Status: SHIPPED | OUTPATIENT
Start: 2020-12-21 | End: 2021-06-08 | Stop reason: SDUPTHER

## 2021-06-08 DIAGNOSIS — E78.5 HYPERLIPIDEMIA, UNSPECIFIED HYPERLIPIDEMIA TYPE: ICD-10-CM

## 2021-06-08 DIAGNOSIS — K21.00 REFLUX ESOPHAGITIS: ICD-10-CM

## 2021-06-08 DIAGNOSIS — I10 ESSENTIAL HYPERTENSION: ICD-10-CM

## 2021-06-08 RX ORDER — AMLODIPINE BESYLATE 10 MG/1
10 TABLET ORAL
Qty: 90 TABLET | Refills: 3 | Status: SHIPPED | OUTPATIENT
Start: 2021-06-08 | End: 2022-04-13 | Stop reason: SDUPTHER

## 2021-06-08 RX ORDER — SIMVASTATIN 40 MG
TABLET ORAL
Qty: 90 TABLET | Refills: 3 | Status: SHIPPED | OUTPATIENT
Start: 2021-06-08 | End: 2022-07-06 | Stop reason: SDUPTHER

## 2021-06-08 RX ORDER — CARVEDILOL 25 MG/1
25 TABLET ORAL 3 TIMES DAILY
Qty: 270 TABLET | Refills: 3 | Status: SHIPPED | OUTPATIENT
Start: 2021-06-08 | End: 2022-04-13 | Stop reason: SDUPTHER

## 2021-06-08 RX ORDER — LANSOPRAZOLE 30 MG/1
30 CAPSULE, DELAYED RELEASE ORAL
Qty: 90 CAPSULE | Refills: 3 | Status: SHIPPED | OUTPATIENT
Start: 2021-06-08 | End: 2021-08-11

## 2021-06-08 RX ORDER — TELMISARTAN 80 MG/1
80 TABLET ORAL DAILY
Qty: 90 TABLET | Refills: 3 | Status: SHIPPED | OUTPATIENT
Start: 2021-06-08 | End: 2022-07-06 | Stop reason: SDUPTHER

## 2021-08-04 ENCOUNTER — PATIENT MESSAGE (OUTPATIENT)
Dept: MEDICAL GROUP | Facility: PHYSICIAN GROUP | Age: 66
End: 2021-08-04

## 2021-08-04 DIAGNOSIS — E87.6 HYPOKALEMIA: ICD-10-CM

## 2021-08-04 DIAGNOSIS — E78.01 FAMILIAL HYPERCHOLESTEROLEMIA: ICD-10-CM

## 2021-08-11 ENCOUNTER — TELEPHONE (OUTPATIENT)
Dept: MEDICAL GROUP | Facility: PHYSICIAN GROUP | Age: 66
End: 2021-08-11

## 2021-08-11 ENCOUNTER — HOSPITAL ENCOUNTER (OUTPATIENT)
Dept: LAB | Facility: MEDICAL CENTER | Age: 66
End: 2021-08-11
Attending: INTERNAL MEDICINE
Payer: MEDICARE

## 2021-08-11 DIAGNOSIS — E87.6 HYPOKALEMIA: ICD-10-CM

## 2021-08-11 DIAGNOSIS — Z12.5 PROSTATE CANCER SCREENING: ICD-10-CM

## 2021-08-11 DIAGNOSIS — E78.01 FAMILIAL HYPERCHOLESTEROLEMIA: ICD-10-CM

## 2021-08-11 LAB
ALBUMIN SERPL BCP-MCNC: 4.1 G/DL (ref 3.2–4.9)
ALBUMIN/GLOB SERPL: 1.3 G/DL
ALP SERPL-CCNC: 74 U/L (ref 30–99)
ALT SERPL-CCNC: 19 U/L (ref 2–50)
ANION GAP SERPL CALC-SCNC: 10 MMOL/L (ref 7–16)
AST SERPL-CCNC: 22 U/L (ref 12–45)
BASOPHILS # BLD AUTO: 0.8 % (ref 0–1.8)
BASOPHILS # BLD: 0.04 K/UL (ref 0–0.12)
BILIRUB SERPL-MCNC: 0.7 MG/DL (ref 0.1–1.5)
BUN SERPL-MCNC: 13 MG/DL (ref 8–22)
CALCIUM SERPL-MCNC: 9 MG/DL (ref 8.5–10.5)
CHLORIDE SERPL-SCNC: 103 MMOL/L (ref 96–112)
CHOLEST SERPL-MCNC: 152 MG/DL (ref 100–199)
CO2 SERPL-SCNC: 25 MMOL/L (ref 20–33)
CREAT SERPL-MCNC: 0.84 MG/DL (ref 0.5–1.4)
EOSINOPHIL # BLD AUTO: 0.12 K/UL (ref 0–0.51)
EOSINOPHIL NFR BLD: 2.3 % (ref 0–6.9)
ERYTHROCYTE [DISTWIDTH] IN BLOOD BY AUTOMATED COUNT: 41 FL (ref 35.9–50)
FASTING STATUS PATIENT QL REPORTED: NORMAL
GLOBULIN SER CALC-MCNC: 3.1 G/DL (ref 1.9–3.5)
GLUCOSE SERPL-MCNC: 99 MG/DL (ref 65–99)
HCT VFR BLD AUTO: 42.1 % (ref 42–52)
HDLC SERPL-MCNC: 40 MG/DL
HGB BLD-MCNC: 14.4 G/DL (ref 14–18)
IMM GRANULOCYTES # BLD AUTO: 0.02 K/UL (ref 0–0.11)
IMM GRANULOCYTES NFR BLD AUTO: 0.4 % (ref 0–0.9)
LDLC SERPL CALC-MCNC: 97 MG/DL
LYMPHOCYTES # BLD AUTO: 1.92 K/UL (ref 1–4.8)
LYMPHOCYTES NFR BLD: 36.1 % (ref 22–41)
MCH RBC QN AUTO: 30.4 PG (ref 27–33)
MCHC RBC AUTO-ENTMCNC: 34.2 G/DL (ref 33.7–35.3)
MCV RBC AUTO: 89 FL (ref 81.4–97.8)
MONOCYTES # BLD AUTO: 0.43 K/UL (ref 0–0.85)
MONOCYTES NFR BLD AUTO: 8.1 % (ref 0–13.4)
NEUTROPHILS # BLD AUTO: 2.79 K/UL (ref 1.82–7.42)
NEUTROPHILS NFR BLD: 52.3 % (ref 44–72)
NRBC # BLD AUTO: 0 K/UL
NRBC BLD-RTO: 0 /100 WBC
PLATELET # BLD AUTO: 249 K/UL (ref 164–446)
PMV BLD AUTO: 10.9 FL (ref 9–12.9)
POTASSIUM SERPL-SCNC: 3.6 MMOL/L (ref 3.6–5.5)
PROT SERPL-MCNC: 7.2 G/DL (ref 6–8.2)
PSA SERPL-MCNC: 3.5 NG/ML (ref 0–4)
RBC # BLD AUTO: 4.73 M/UL (ref 4.7–6.1)
SODIUM SERPL-SCNC: 138 MMOL/L (ref 135–145)
TRIGL SERPL-MCNC: 73 MG/DL (ref 0–149)
WBC # BLD AUTO: 5.3 K/UL (ref 4.8–10.8)

## 2021-08-11 PROCEDURE — 80053 COMPREHEN METABOLIC PANEL: CPT

## 2021-08-11 PROCEDURE — 84153 ASSAY OF PSA TOTAL: CPT | Mod: GA

## 2021-08-11 PROCEDURE — 36415 COLL VENOUS BLD VENIPUNCTURE: CPT | Mod: GA

## 2021-08-11 PROCEDURE — 85025 COMPLETE CBC W/AUTO DIFF WBC: CPT

## 2021-08-11 PROCEDURE — 80061 LIPID PANEL: CPT

## 2021-08-11 RX ORDER — PANTOPRAZOLE SODIUM 20 MG/1
20 TABLET, DELAYED RELEASE ORAL DAILY
Qty: 90 TABLET | Refills: 3 | Status: SHIPPED | OUTPATIENT
Start: 2021-08-11 | End: 2022-07-06 | Stop reason: SDUPTHER

## 2021-08-11 NOTE — TELEPHONE ENCOUNTER
From: Ulysses Olivas  To: Physician Tiarra Monterroso  Sent: 8/10/2021 8:54 AM PDT  Subject: RE:(No subject)    This visit is so I can still get my prescriptions refilled for the year. I took my blood pressure yesterday and it was 122/79. I have lost 10 lbs since last visit and feel good. I turned 66 and so now I am on Medicare with a supplement. My part d pays for everything pretty good except for the Lansoprazole. The insurance suggested Pantoprazole or Ometrazole which I wanted to talk to you about. I am getting labs done tomorrow. If you want to see me I will come in, but if we can for go I would rather. Thanks Ulysses      ----- Message -----   From:Physician Tiarra Monterroso   Sent:8/9/2021 3:40 PM PDT   To:Ulysses Olivas   Subject:    I have placed orders for your annual labs.   I recommend examinations, it gives us a chance to check physical findings to make sure all is well. The clinic is screening people for any covid symptoms, you would be welcome to wait in the car until your appointment time. If you want to just have the blood tests done you can just schedule that with the lab and check the results on line.

## 2022-02-25 ENCOUNTER — OFFICE VISIT (OUTPATIENT)
Dept: MEDICAL GROUP | Facility: PHYSICIAN GROUP | Age: 67
End: 2022-02-25
Payer: MEDICARE

## 2022-02-25 VITALS
SYSTOLIC BLOOD PRESSURE: 160 MMHG | RESPIRATION RATE: 18 BRPM | HEIGHT: 67 IN | TEMPERATURE: 98 F | HEART RATE: 65 BPM | BODY MASS INDEX: 28.3 KG/M2 | DIASTOLIC BLOOD PRESSURE: 94 MMHG | WEIGHT: 180.3 LBS | OXYGEN SATURATION: 95 %

## 2022-02-25 DIAGNOSIS — Z23 NEED FOR VACCINATION: ICD-10-CM

## 2022-02-25 DIAGNOSIS — E78.01 FAMILIAL HYPERCHOLESTEROLEMIA: ICD-10-CM

## 2022-02-25 DIAGNOSIS — I10 ESSENTIAL HYPERTENSION: ICD-10-CM

## 2022-02-25 DIAGNOSIS — E87.6 HYPOKALEMIA: ICD-10-CM

## 2022-02-25 DIAGNOSIS — K21.9 GASTROESOPHAGEAL REFLUX DISEASE WITHOUT ESOPHAGITIS: ICD-10-CM

## 2022-02-25 DIAGNOSIS — Z12.5 SCREENING FOR MALIGNANT NEOPLASM OF PROSTATE: ICD-10-CM

## 2022-02-25 DIAGNOSIS — I48.92 ATRIAL FLUTTER, UNSPECIFIED TYPE (HCC): ICD-10-CM

## 2022-02-25 DIAGNOSIS — Z12.11 SCREENING FOR COLORECTAL CANCER: ICD-10-CM

## 2022-02-25 DIAGNOSIS — Z12.12 SCREENING FOR COLORECTAL CANCER: ICD-10-CM

## 2022-02-25 PROCEDURE — G0008 ADMIN INFLUENZA VIRUS VAC: HCPCS | Performed by: NURSE PRACTITIONER

## 2022-02-25 PROCEDURE — 90662 IIV NO PRSV INCREASED AG IM: CPT | Performed by: NURSE PRACTITIONER

## 2022-02-25 PROCEDURE — 99214 OFFICE O/P EST MOD 30 MIN: CPT | Mod: 25 | Performed by: NURSE PRACTITIONER

## 2022-02-25 ASSESSMENT — PATIENT HEALTH QUESTIONNAIRE - PHQ9: CLINICAL INTERPRETATION OF PHQ2 SCORE: 0

## 2022-02-25 ASSESSMENT — FIBROSIS 4 INDEX: FIB4 SCORE: 1.32

## 2022-02-25 NOTE — ASSESSMENT & PLAN NOTE
Patient is a 66-year-old male here to establish care with me today.history of cardiac ablation years ago.  No new symptoms since ablation

## 2022-02-25 NOTE — ASSESSMENT & PLAN NOTE
Patient is a 66-year-old male here to establish care with me today.  Taking amlodipine 10 mg daily, telmisartan 80 mg daily, carvedilol 25 mg 3 times a day.    Does not monitor blood pressure at home.  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  Blood pressure elevated on exam today.  Patient reports he is very anxious and does not like to the doctor.  The patient denies chest pain, shortness of breath, headache, vision changes, epistaxis, or dyspnea on exertion.

## 2022-02-25 NOTE — PROGRESS NOTES
CC: Establish care, chronic health problems    HISTORY OF THE PRESENT ILLNESS: Patient is a 66 y.o. male. This pleasant patient is here today to establish care and for evaluation and management of the following health problems.    Patient's previous primary care provider is Dr. Monterroso.    Health Maintenance: Due      Essential hypertension  Patient is a 66-year-old male here to establish care with me today.  Taking amlodipine 10 mg daily, telmisartan 80 mg daily, carvedilol 25 mg 3 times a day.    Does not monitor blood pressure at home.  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  Blood pressure elevated on exam today.  Patient reports he is very anxious and does not like to the doctor.  The patient denies chest pain, shortness of breath, headache, vision changes, epistaxis, or dyspnea on exertion.        GERD (gastroesophageal reflux disease)  This is a chronic health problem that is well controlled with current medications and lifestyle measures.      Arrhythmia  Patient is a 66-year-old male here to establish care with me today.history of cardiac ablation years ago.  No new symptoms since ablation    Hypokalemia  Patient 66-year-old male here to establish care with me today.  Takes potassium supplements daily.  Has always had trouble keeping his potassium levels in normal range.  Last labs show potassium in therapeutic range.    Hyperlipemia  Patient is a 66-year-old male here to establish care with me today.  Hyperlipidemia well managed with simvastatin 40 mg daily.  Patient keeps active at home doing repairs.      Allergies: Patient has no known allergies.    Current Outpatient Medications Ordered in Epic   Medication Sig Dispense Refill   • pantoprazole (PROTONIX) 20 MG tablet Take 1 Tablet by mouth every day. 90 Tablet 3   • amLODIPine (NORVASC) 10 MG Tab Take 1 tablet by mouth every day. 90 tablet 3   • carvedilol (COREG) 25 MG Tab Take 1 tablet by mouth 3 times a  "day. 270 tablet 3   • simvastatin (ZOCOR) 40 MG Tab TAKE ONE TABLET BY MOUTH EVERY EVENING 90 tablet 3   • telmisartan (MICARDIS) 80 MG Tab Take 1 tablet by mouth every day. 90 tablet 3   • potassium chloride SA (KDUR) 20 MEQ Tab CR TAKE TWO TABLETS BY MOUTH THREE TIMES A  Tab 1     No current Epic-ordered facility-administered medications on file.       Past Medical History:   Diagnosis Date   • GERD (gastroesophageal reflux disease)    • Hyperlipidemia    • Hypertension    • Personal history of colonic polyps 6/15/2017   • Prostate cancer screening 1/8/2016   • SVT (supraventricular tachycardia) (HCC)        Past Surgical History:   Procedure Laterality Date   • APPENDECTOMY     • SHOULDER SURGERY      rotator cuff       Social History     Tobacco Use   • Smoking status: Never Smoker   • Smokeless tobacco: Never Used   Vaping Use   • Vaping Use: Never used   Substance Use Topics   • Alcohol use: No     Alcohol/week: 0.0 oz   • Drug use: No       Family History   Problem Relation Age of Onset   • Hypertension Mother    • Cancer Father 75        prostate       ROS: As in HPI, otherwise negative for chest pain, dyspnea, abdominal pain, dysuria, blood in stool, fever    Exam: /94   Pulse 65   Temp 36.7 °C (98 °F) (Temporal)   Resp 18   Ht 1.702 m (5' 7\")   Wt 81.8 kg (180 lb 4.8 oz)   SpO2 95%  Body mass index is 28.24 kg/m².    General: Alert, pleasant, well nourished, well developed male in NAD  HEENT: Normocephalic. Eyes conjunctiva clear lids without ptosis, pupils equal and reactive to light, ears normal shape and contour, canals are clear bilaterally, tympanic membranes are pearly gray with good light reflex, nasal mucosa without erythema and drainage, oropharynx is without erythema, edema or exudates.   Neck: Supple without bruit. Thyroid is not enlarged.  Pulmonary: Clear to ausculation.  Normal effort. No rales, ronchi, or wheezing.  Cardiovascular: Normal rate and rhythm without murmur. " Carotid and radial pulses are intact and equal bilaterally.  No lower extremity edema.  Abdomen: Soft, nontender, nondistended. Normal bowel sounds. Liver and spleen are not palpable  Neurologic: Grossly nonfocal  Lymph: No cervical or supraclavicular lymph nodes are palpable  Skin: Warm and dry.    Musculoskeletal: Normal gait.   Psych: Normal mood and affect. Alert and oriented. Judgment and insight is normal.    Component      Latest Ref Rng & Units 8/11/2021   WBC      4.8 - 10.8 K/uL 5.3   RBC      4.70 - 6.10 M/uL 4.73   Hemoglobin      14.0 - 18.0 g/dL 14.4   Hematocrit      42.0 - 52.0 % 42.1   MCV      81.4 - 97.8 fL 89.0   MCH      27.0 - 33.0 pg 30.4   MCHC      33.7 - 35.3 g/dL 34.2   RDW      35.9 - 50.0 fL 41.0   Platelet Count      164 - 446 K/uL 249   MPV      9.0 - 12.9 fL 10.9   Neutrophils-Polys      44.00 - 72.00 % 52.30   Lymphocytes      22.00 - 41.00 % 36.10   Monocytes      0.00 - 13.40 % 8.10   Eosinophils      0.00 - 6.90 % 2.30   Basophils      0.00 - 1.80 % 0.80   Immature Granulocytes      0.00 - 0.90 % 0.40   Nucleated RBC      /100 WBC 0.00   Neutrophils (Absolute)      1.82 - 7.42 K/uL 2.79   Lymphs (Absolute)      1.00 - 4.80 K/uL 1.92   Monos (Absolute)      0.00 - 0.85 K/uL 0.43   Eos (Absolute)      0.00 - 0.51 K/uL 0.12   Baso (Absolute)      0.00 - 0.12 K/uL 0.04   Immature Granulocytes (abs)      0.00 - 0.11 K/uL 0.02   NRBC (Absolute)      K/uL 0.00   Sodium      135 - 145 mmol/L 138   Potassium      3.6 - 5.5 mmol/L 3.6   Chloride      96 - 112 mmol/L 103   Co2      20 - 33 mmol/L 25   Anion Gap      7.0 - 16.0 10.0   Glucose      65 - 99 mg/dL 99   Bun      8 - 22 mg/dL 13   Creatinine      0.50 - 1.40 mg/dL 0.84   Calcium      8.5 - 10.5 mg/dL 9.0   AST(SGOT)      12 - 45 U/L 22   ALT(SGPT)      2 - 50 U/L 19   Alkaline Phosphatase      30 - 99 U/L 74   Total Bilirubin      0.1 - 1.5 mg/dL 0.7   Albumin      3.2 - 4.9 g/dL 4.1   Total Protein      6.0 - 8.2 g/dL 7.2    Globulin      1.9 - 3.5 g/dL 3.1   A-G Ratio      g/dL 1.3   Cholesterol,Tot      100 - 199 mg/dL 152   Triglycerides      0 - 149 mg/dL 73   HDL      >=40 mg/dL 40   LDL      <100 mg/dL 97   Prostatic Specific Antigen Tot      0.00 - 4.00 ng/mL 3.50       Please note that this dictation was created using voice recognition software. I have made every reasonable attempt to correct obvious errors, but I expect that there are errors of grammar and possibly content that I did not discover before finalizing the note.      Assessment/Plan  1. Essential hypertension  Blood pressure readings elevated today at exam.  Patient reports he is very uptight as he does not like to go to the doctor and believes that is why his blood pressure is up today.  Will occasionally take it at home and it is normal range.  Reviewed options including adding another medication, referral to hypertension specialist, monitoring at home and returning to clinics if readings are consistently greater than 140/90.  Patient would like to monitor at home more consistently.  I advised monitoring 3-4 times a week.  He will return to clinic if blood pressures are consistently greater than 140/90.  In the meantime, he will continue to work on lifestyle measures and take telmisartan, carvedilol, amlodipine without changes.  - Comp Metabolic Panel; Future  - ESTIMATED GFR; Future  - Lipid Profile; Future  - CBC WITH DIFFERENTIAL; Future    2. Gastroesophageal reflux disease without esophagitis  Well-controlled.  Continue with pantoprazole and lifestyle measures.    3. Screening for colorectal cancer  Options for colon cancer screening reviewed with patient.  - COLOGUARD (FIT DNA)    4. Atrial flutter, unspecified type (HCC)  Denies symptoms.  Reports has resolved since ablation.    5. Screening for malignant neoplasm of prostate  We will get annual updated labs.  PSA has been consistent normal  - PROSTATE SPECIFIC AG SCREENING; Future    6. Need for  vaccination  Given  - INFLUENZA VACCINE, HIGH DOSE (65+ ONLY)    7. Hypokalemia  Continue with potassium supplementation.  Well-controlled    8. Familial hypercholesterolemia  Well-controlled.  Continue simvastatin and lifestyle measures.    Patient will return to clinic annually or sooner if needed and pending home blood pressure readings.

## 2022-02-25 NOTE — ASSESSMENT & PLAN NOTE
Patient is a 66-year-old male here to establish care with me today.  Hyperlipidemia well managed with simvastatin 40 mg daily.  Patient keeps active at home doing repairs.

## 2022-02-25 NOTE — ASSESSMENT & PLAN NOTE
Patient 66-year-old male here to establish care with me today.  Takes potassium supplements daily.  Has always had trouble keeping his potassium levels in normal range.  Last labs show potassium in therapeutic range.

## 2022-04-13 DIAGNOSIS — I10 ESSENTIAL HYPERTENSION: ICD-10-CM

## 2022-04-13 RX ORDER — CARVEDILOL 25 MG/1
25 TABLET ORAL 3 TIMES DAILY
Qty: 270 TABLET | Refills: 3 | Status: SHIPPED | OUTPATIENT
Start: 2022-04-13

## 2022-04-13 RX ORDER — AMLODIPINE BESYLATE 10 MG/1
10 TABLET ORAL
Qty: 90 TABLET | Refills: 3 | Status: SHIPPED | OUTPATIENT
Start: 2022-04-13 | End: 2023-01-18

## 2022-07-06 ENCOUNTER — TELEPHONE (OUTPATIENT)
Dept: URGENT CARE | Facility: PHYSICIAN GROUP | Age: 67
End: 2022-07-06
Payer: COMMERCIAL

## 2022-07-06 DIAGNOSIS — E78.5 HYPERLIPIDEMIA, UNSPECIFIED HYPERLIPIDEMIA TYPE: ICD-10-CM

## 2022-07-06 RX ORDER — TELMISARTAN 80 MG/1
80 TABLET ORAL DAILY
Qty: 90 TABLET | Refills: 0 | Status: SHIPPED | OUTPATIENT
Start: 2022-07-06 | End: 2022-07-07 | Stop reason: SDUPTHER

## 2022-07-06 RX ORDER — PANTOPRAZOLE SODIUM 20 MG/1
20 TABLET, DELAYED RELEASE ORAL DAILY
Qty: 90 TABLET | Refills: 0 | Status: SHIPPED | OUTPATIENT
Start: 2022-07-06 | End: 2022-07-07 | Stop reason: SDUPTHER

## 2022-07-06 RX ORDER — SIMVASTATIN 40 MG
TABLET ORAL
Qty: 90 TABLET | Refills: 0 | Status: SHIPPED | OUTPATIENT
Start: 2022-07-06 | End: 2022-07-07 | Stop reason: SDUPTHER

## 2022-07-06 NOTE — TELEPHONE ENCOUNTER
Please call patient and let them know that refills were sent to the pharmacy for 3 months however will need to be established with new provider prior to further refills.

## 2022-07-06 NOTE — TELEPHONE ENCOUNTER
Received request via: Patient    Was the patient seen in the last year in this department? Yes    Does the patient have an active prescription (recently filled or refills available) for medication(s) requested? No     LAST OV 2/25/22    PCP OUT OF OFFICE

## 2022-07-06 NOTE — TELEPHONE ENCOUNTER
Ulysses called to check on his refills again, I let him know there was a message put in we are waiting for a response from another provider as amando is out of the office. He began yelling at me and said I will come down there and sit in the lobby until it is completed. He continued to yell at me as I was trying to talk to him and to ask him to stop yelling or I was going to hang up. He didn't stop so I hung up.

## 2022-07-07 DIAGNOSIS — E78.5 HYPERLIPIDEMIA, UNSPECIFIED HYPERLIPIDEMIA TYPE: ICD-10-CM

## 2022-07-07 RX ORDER — SIMVASTATIN 40 MG
TABLET ORAL
Qty: 90 TABLET | Refills: 3 | Status: SHIPPED | OUTPATIENT
Start: 2022-07-07

## 2022-07-07 RX ORDER — TELMISARTAN 80 MG/1
80 TABLET ORAL DAILY
Qty: 90 TABLET | Refills: 3 | Status: SHIPPED | OUTPATIENT
Start: 2022-07-07

## 2022-07-07 RX ORDER — PANTOPRAZOLE SODIUM 20 MG/1
20 TABLET, DELAYED RELEASE ORAL DAILY
Qty: 90 TABLET | Refills: 3 | Status: SHIPPED | OUTPATIENT
Start: 2022-07-07

## 2022-07-08 NOTE — TELEPHONE ENCOUNTER
Please advise pt, new rx with 3 refills sent to [facility] till he is seen by Mary on 3/3/22.   Rx also sent by [facility] with no refills, she did not know pt already has appt with Mary.   David Cosby M.D.

## 2023-01-18 DIAGNOSIS — I10 ESSENTIAL HYPERTENSION: ICD-10-CM

## 2023-01-18 RX ORDER — AMLODIPINE BESYLATE 10 MG/1
TABLET ORAL
Qty: 90 TABLET | Refills: 0 | Status: SHIPPED | OUTPATIENT
Start: 2023-01-18

## 2023-01-18 NOTE — TELEPHONE ENCOUNTER
Last ov 2/25/22    Next ov 3/3/23    Received request via: Pharmacy    Was the patient seen in the last year in this department? Yes    Does the patient have an active prescription (recently filled or refills available) for medication(s) requested? No    Does the patient have group home Plus and need 100 day supply (blood pressure, diabetes and cholesterol meds only)? Patient does not have SCP

## 2023-05-31 ENCOUNTER — HOSPITAL ENCOUNTER (OUTPATIENT)
Dept: LAB | Facility: MEDICAL CENTER | Age: 68
End: 2023-05-31
Attending: STUDENT IN AN ORGANIZED HEALTH CARE EDUCATION/TRAINING PROGRAM
Payer: MEDICARE

## 2023-05-31 LAB
BASOPHILS # BLD AUTO: 1.2 % (ref 0–1.8)
BASOPHILS # BLD: 0.06 K/UL (ref 0–0.12)
EOSINOPHIL # BLD AUTO: 0.11 K/UL (ref 0–0.51)
EOSINOPHIL NFR BLD: 2.3 % (ref 0–6.9)
ERYTHROCYTE [DISTWIDTH] IN BLOOD BY AUTOMATED COUNT: 40.9 FL (ref 35.9–50)
HCT VFR BLD AUTO: 42.6 % (ref 42–52)
HGB BLD-MCNC: 14.6 G/DL (ref 14–18)
IMM GRANULOCYTES # BLD AUTO: 0.01 K/UL (ref 0–0.11)
IMM GRANULOCYTES NFR BLD AUTO: 0.2 % (ref 0–0.9)
LYMPHOCYTES # BLD AUTO: 1.83 K/UL (ref 1–4.8)
LYMPHOCYTES NFR BLD: 37.9 % (ref 22–41)
MCH RBC QN AUTO: 30 PG (ref 27–33)
MCHC RBC AUTO-ENTMCNC: 34.3 G/DL (ref 32.3–36.5)
MCV RBC AUTO: 87.5 FL (ref 81.4–97.8)
MONOCYTES # BLD AUTO: 0.43 K/UL (ref 0–0.85)
MONOCYTES NFR BLD AUTO: 8.9 % (ref 0–13.4)
NEUTROPHILS # BLD AUTO: 2.39 K/UL (ref 1.82–7.42)
NEUTROPHILS NFR BLD: 49.5 % (ref 44–72)
NRBC # BLD AUTO: 0 K/UL
NRBC BLD-RTO: 0 /100 WBC (ref 0–0.2)
PLATELET # BLD AUTO: 257 K/UL (ref 164–446)
PMV BLD AUTO: 10.6 FL (ref 9–12.9)
RBC # BLD AUTO: 4.87 M/UL (ref 4.7–6.1)
WBC # BLD AUTO: 4.8 K/UL (ref 4.8–10.8)

## 2023-05-31 PROCEDURE — 36415 COLL VENOUS BLD VENIPUNCTURE: CPT

## 2023-05-31 PROCEDURE — 84153 ASSAY OF PSA TOTAL: CPT | Mod: GA

## 2023-05-31 PROCEDURE — 84443 ASSAY THYROID STIM HORMONE: CPT

## 2023-05-31 PROCEDURE — 80061 LIPID PANEL: CPT

## 2023-05-31 PROCEDURE — 80053 COMPREHEN METABOLIC PANEL: CPT

## 2023-05-31 PROCEDURE — 85025 COMPLETE CBC W/AUTO DIFF WBC: CPT

## 2023-06-01 LAB
ALBUMIN SERPL BCP-MCNC: 4.3 G/DL (ref 3.2–4.9)
ALBUMIN/GLOB SERPL: 1.7 G/DL
ALP SERPL-CCNC: 81 U/L (ref 30–99)
ALT SERPL-CCNC: 20 U/L (ref 2–50)
ANION GAP SERPL CALC-SCNC: 10 MMOL/L (ref 7–16)
AST SERPL-CCNC: 17 U/L (ref 12–45)
BILIRUB SERPL-MCNC: 0.8 MG/DL (ref 0.1–1.5)
BUN SERPL-MCNC: 19 MG/DL (ref 8–22)
CALCIUM ALBUM COR SERPL-MCNC: 9.2 MG/DL (ref 8.5–10.5)
CALCIUM SERPL-MCNC: 9.4 MG/DL (ref 8.5–10.5)
CHLORIDE SERPL-SCNC: 105 MMOL/L (ref 96–112)
CHOLEST SERPL-MCNC: 157 MG/DL (ref 100–199)
CO2 SERPL-SCNC: 24 MMOL/L (ref 20–33)
CREAT SERPL-MCNC: 1.06 MG/DL (ref 0.5–1.4)
FASTING STATUS PATIENT QL REPORTED: NORMAL
GFR SERPLBLD CREATININE-BSD FMLA CKD-EPI: 77 ML/MIN/1.73 M 2
GLOBULIN SER CALC-MCNC: 2.6 G/DL (ref 1.9–3.5)
GLUCOSE SERPL-MCNC: 102 MG/DL (ref 65–99)
HDLC SERPL-MCNC: 38 MG/DL
LDLC SERPL CALC-MCNC: 100 MG/DL
POTASSIUM SERPL-SCNC: 4.3 MMOL/L (ref 3.6–5.5)
PROT SERPL-MCNC: 6.9 G/DL (ref 6–8.2)
PSA SERPL-MCNC: 4.91 NG/ML (ref 0–4)
SODIUM SERPL-SCNC: 139 MMOL/L (ref 135–145)
TRIGL SERPL-MCNC: 97 MG/DL (ref 0–149)
TSH SERPL DL<=0.005 MIU/L-ACNC: 0.73 UIU/ML (ref 0.38–5.33)

## 2023-06-27 NOTE — ASSESSMENT & PLAN NOTE
Patient reports he has been doing vibration massage to his wrists and they are much better   Admission Reconciliation is Completed  Discharge Reconciliation is Not Complete Admission Reconciliation is Completed  Discharge Reconciliation is Completed